# Patient Record
Sex: FEMALE | Race: WHITE | URBAN - NONMETROPOLITAN AREA
[De-identification: names, ages, dates, MRNs, and addresses within clinical notes are randomized per-mention and may not be internally consistent; named-entity substitution may affect disease eponyms.]

---

## 2022-09-15 ENCOUNTER — HOSPITAL ENCOUNTER (OUTPATIENT)
Dept: OCCUPATIONAL THERAPY | Age: 57
Setting detail: THERAPIES SERIES
Discharge: HOME | End: 2022-09-15
Payer: COMMERCIAL

## 2022-09-15 PROCEDURE — 97165 OT EVAL LOW COMPLEX 30 MIN: CPT

## 2022-09-15 PROCEDURE — 97110 THERAPEUTIC EXERCISES: CPT

## 2022-09-15 NOTE — PROGRESS NOTES
** PLEASE SIGN, DATE AND TIME CERTIFICATION BELOW AND RETURN TO [facility] OUTPATIENT REHABILITATION (FAX #: [phone]). ATTEST/CO-SIGN IF ACCESSING VIA INElectronic Brailler. THANK YOU.**    I certify that I have examined the patient below and determined that Physical Medicine and Rehabilitation service is necessary and that I approve the established plan of care for up to 90 days or as specifically noted.   Attestation, signature or co-signature of physician indicates approval of certification requirements.    ________________________ ____________ __________  Physician Signature   Date   Time   [address]  [x] EVALUATION  [] DAILY NOTE (LAND) [] DAILY NOTE (AQUATIC ) [] PROGRESS NOTE [] DISCHARGE NOTE    [] [facility]   [] [facility] 90    [] [facility]   [x] Precious Citizen    Date: 9/15/2022  Patient Name:  Alba Orantes  : 1965  MRN: [id number]  CSN: [id number]    Referring Practitioner Sen Melgar DO   Diagnosis Adhesive capsulitis of left shoulder [M75.02]  Incomplete rotator cuff tear or rupture of left shoulder, not specified as traumatic [M75.112]    Treatment Diagnosis Adhesive capsulitis of left shoulder   Date of Evaluation 9/15/22      Functional Outcome Measure Used Upper Extremity Functional Scale   Functional Outcome Score 72/80 (9/15/22)       Insurance: Primary: Payor: Lars Mitchell /  /  / ,   Secondary:    Authorization Information: 90 visits PT/OT/ST combined - none used, no precert required, aquatics covered, modalities covered   Visit # 1, 1/10 for progress note   Visits Allowed: 30   Recertification Date: November 10, 2022   Physician Follow-Up: 2022   Physician Orders: Script for eval adhesive capsulitis of left shoulder, incomplete tear of left rotator cuff, unspecified whether traumatic   Pertinent History: Patient reports about 6 months ago she notices she was having some mobility and strength issues in her left shoulder and when she moved it would catch. No specific injury reported. Patient reports partial tear and a cyst on left shoulder per MRI. Past medical history includes high blood pressure. SUBJECTIVE: Patient states she would like education on exercises and she feels she can do her exercises on her own. Social/Functional History:  Medications and Allergies have been reviewed and are listed on the Medical History Questionnaire    Rodolfo Gonsalves lives with spouse in a multiple floor home with stairs and a handrail to enter. .    Task Prior Level of Function  (current level of function addressed below)   ADLs  Independent   Ambulation Independent   Transfers Independent   Hobbies Gardening, refinish furniture, crafts, DIY   Driving Active    Work Retired from insurance      OBJECTIVE:  Hand Dominance right handed   Palpation Crepitus reported by patient, palpated in upper trap   Observation    Posture Left shoulder lower than right   Edema None observed       ADL's Difficulty with dressing, lifting, most difficult with sleeping, difficulty with dressing, decreased strength with gardening, difficulty with lifting over head, decreased ROM, difficulty with overhead reaching and doing her hair, pain with drying back       Sensation Left Upper Extremity: Diminished.   Numbness and tingling reported from shoulder to elbow at times   Coordination Impaired: digit tip opposition intact           LEFT UPPER EXTREMITY  RANGE OF MOTION    AROM PROM COMMENTS         Shoulder Flexion 132 138    Shoulder Extension 43 48    Shoulder Abduction 128 140    Shoulder Adduction      Shoulder External Rotation 55 60    Shoulder Internal Rotation 40 48    Shoulder Range of Motion is [facility] [place]  []      Elbow Flexion      Elbow Extension      Forearm Pronation      Forearm Supination      Elbow Range of Motion is [place] [place]  [x]      Wrist Flexion      Wrist Extension      Wrist Radial Deviation      Wrist Ulnar Deviation      Wrist Range of Motion is [facility] [place]  [x]   If no measurement is recorded, no formal assessment was completed for that motion. LEFT UPPER EXTREMITY  STRENGTH    Strength Rating Comments   Shoulder Flexion 4/5    Shoulder Extension 4/5    Shoulder Abduction 4-/5    Shoulder Adduction 4-/5    Shoulder External Rotation 4-/5    Shoulder Internal Rotation 4-/5    []  Shoulder Strength is grossly WFL. Elbow Flexion 4/5    Elbow Extension 4/5    Forearm Pronation     Forearm Supination     [] Elbow Strength is grossly WFL. Wrist Flexion     Wrist Extension     Wrist Radial Deviation     Wrist Ulnar Deviation     [x]  Wrist Strength is grossly WFL. Right Left    Strength Settinnd 70 45   Pinch Strength Tip Pinch:      Lateral Pinch           If no ratings are recorded, no formal assessment was completed.      TREATMENT   Precautions:  none per patient    Pain: 3/10 left anterior shoulder      X in shaded column indicates Activity Completed Today   Modalities Parameters/  Location  Notes/Comments                     Manual Therapy Time/  Technique  Notes/Comments                     Exercises   Sets/  Sec Reps  Notes/Comments   Scapular retraction 1 10 X    Wall slides for flexion and scaption 5 5 X    Modified corner pec stretch 10 3 X    Posterior capsule stretch 10 3 X                         Activities Time    Notes/Comments                       Specific Interventions Next Treatment: ultrasound as needed for pain control, Kinesiotaping, AROM, AAROM, PROM, rotator cuff strengthening, stretching    Activity/Treatment Tolerance:  [x]  Patient tolerated treatment well  []  Patient limited by fatigue  []  Patient limited by pain   []  Patient limited by other medical complications  []  Other:     Assessment: Patient with impaired left shoulder active and passive ROM, decreased shoulder strength and pain anterior shoulder with report of partial rotator cuff tear, shoulder cyst and bursitis. Patient has difficulty with dressing and bathing, doing her hair, reaching over head, sleeping, lifting, pain with working out. Patient requires skilled OT to increase functional ROM and strength, decrease pain and return to prior level of functioning. Areas for Improvement: impaired ROM, impaired sensation, impaired strength, and pain  Prognosis: good    GOALS:  Patient Goal: increase strength, get rid of pain with sleeping    Short Term Goals:  Time Frame: 5 weeks   Patient will be independent with UE HEP for increased ease with dressing and doing her hair. Patient will increase AROM left shoulder flexion to 152, extension to 50, abduction to 138, IR to 50 and ER to 65 degrees for increased ease with overhead reaching. Patient will increase PROM left shoulder flexion to 148, extension to 55, abduction to 150, IR to 58 and ER to 70 degrees for increased ease with AROM and hooking her bra. Patient will report pain in her left shoulder no greater than 2/10 with reaching tasks. Long Term Goals:  Time Frame: 8 weeks  1. Patient will improve UEFS to at least 76.  2.  Patient will demonstrate ability to reach overhead for an object with affected extremity without increased pain. 3.  Patient will report increased ease with sleep with no instances of left shoulder pain waking her at night. Patient Education:   [x]  HEP/Education Completed: Plan of Care, Goals,   [address] Access Code for HEP: Access Code: [email or web address]  URL: [email or web address]  Date: 09/15/2022  Prepared by:  Alex Kamara    Exercises  Seated Scapular Retraction - 2-3 x daily - 7 x weekly - 1 sets - 10 reps  Standing Shoulder Posterior Capsule Stretch - 2-3 x daily - 7 x weekly - 1 sets - 10 reps  Wall [place] - 2 x daily - 7 x weekly - 1 sets - 10 reps - 3 hold  Shoulder Flexion Wall Slide with Towel - 2 x daily - 7 x weekly - 1 sets - 10 reps - 3 hold  Standing Shoulder Abduction Slides at Wall - 2 x daily - 7 x weekly - 1 sets - 10 reps - 3 hold  Doorway Pec Stretch at 60 Elevation - 2-3 x daily - 7 x weekly - 1 sets - 5 reps - 10 hold  Sleeper Stretch - 2 x daily - 7 x weekly - 1 sets - 5 reps - 10-15 hold    []  No new Education completed  []  Reviewed Prior HEP      [x]  Patient verbalized and/or demonstrated understanding of education provided. []  Patient unable to verbalize and/or demonstrate understanding of education provided. Will continue education. [x]  Barriers to learning: none    PLAN:  Treatment Recommendations: Strengthening, Range of Motion, Manual Therapy - Soft Tissue Mobilization, Manual Therapy - Joint Manipulation, Home Exercise Program, and Modalities    [x]  Plan of care initiated. Plan to see patient 2 times per week for 8 weeks to address the treatment planned outlined above. []  Continue with current plan of care  []  Modify plan of care as follows:    []  Hold pending physician visit  []  Discharge    Time In 0818   Time Out 0910   Timed Code Minutes: 10 min   Total Treatment Time: 52 min       Electronically Signed by:  AMANDA Ivan/SHERITA #[id number]

## 2022-09-22 ENCOUNTER — HOSPITAL ENCOUNTER (OUTPATIENT)
Dept: OCCUPATIONAL THERAPY | Age: 57
Setting detail: THERAPIES SERIES
Discharge: HOME | End: 2022-09-22
Payer: COMMERCIAL

## 2022-09-22 PROCEDURE — 97110 THERAPEUTIC EXERCISES: CPT

## 2022-09-22 PROCEDURE — 97035 APP MDLTY 1+ULTRASOUND EA 15: CPT

## 2022-09-22 NOTE — PROGRESS NOTES
[address] THERAPY  [] EVALUATION  [x] DAILY NOTE (LAND) [] DAILY NOTE (AQUATIC ) [] PROGRESS NOTE [] DISCHARGE NOTE    [] [address] [facility] [place]   [] [facility] [address]    [] [address] [employer]   [x] Celi Harris    Date: 2022  Patient Name:  Jennifer Bansal  : 1965  MRN: [id number]  CSN: [id number]    Referring Practitioner Carmen Alvarado DO   Diagnosis Adhesive capsulitis of left shoulder [M75.02]  Incomplete rotator cuff tear or rupture of left shoulder, not specified as traumatic [M75.112]    Treatment Diagnosis Adhesive capsulitis of left shoulder   Date of Evaluation 9/15/22      Functional Outcome Measure Used Upper Extremity Functional Scale   Functional Outcome Score 72/80 (9/15/22)       Insurance: Primary: Payor: Samanta Ferguson /  /  / ,   Secondary:    Authorization Information: 90 visits PT/OT/ST combined - none used, no precert required, aquatics covered, modalities covered   Visit # 2, 2/10 for progress note   Visits Allowed:    Recertification Date: November 10, 2022   Physician Follow-Up: 2022   Physician Orders: Script for eval adhesive capsulitis of left shoulder, incomplete tear of left rotator cuff, unspecified whether traumatic   Pertinent History: Patient reports about 6 months ago she notices she was having some mobility and strength issues in her left shoulder and when she moved it would catch. No specific injury reported. Patient reports partial tear and a cyst on left shoulder per MRI. Past medical history includes high blood pressure. SUBJECTIVE: Patient states she she messed up her back and has a chiropractor appointment today. Patient reports she tries to put her hand behind her back and that is when she notices clicking in her shoulder.         OBJECTIVE:    TREATMENT   Precautions:  none per patient    Pain: 3/10 left anterior shoulder      X in shaded column indicates Activity Completed Today   Modalities Parameters/  Location  Notes/Comments   Ultrasound to anterior shoulder for pain control 100% continuous, 1.1 henley/cm2, 1 MHz x 8 minutes X    STM to left anterior and lateral shoulder for tight/painful musculature  X          Manual Therapy Time/  Technique  Notes/Comments   Supine PROM to left shoulder all planes to tolerance  X Tightest in ER with most tenderness reported               Exercises   Sets/  Sec Reps  Notes/Comments   Scapular retraction 1 10 X    Pulleys for shoulder flexion 1 10 X    Supine ER with hands behind head for ER stretch 5 sec 5 X    Supine dowel yelitza for shoulder flexion with joint distraction for improved tolerance, horizontal abduction/adduction 1  10 ea X    IR sleeper stretch 15 sec 5 X Cues and demonstration for correct technique   Wall slides for flexion and scaption 5 5     Modified corner pec stretch 10 3 X    Posterior capsule stretch 10 3 X Completed in supine today                        Activities Time    Notes/Comments                       Specific Interventions Next Treatment: ultrasound as needed for pain control, Kinesiotaping, AROM, AAROM, PROM, rotator cuff strengthening, stretching    Activity/Treatment Tolerance:  [x]  Patient tolerated treatment well  []  Patient limited by fatigue  []  Patient limited by pain   []  Patient limited by other medical complications  []  Other:     Assessment: Patient is progressing towards her goals. Patient with tenderness with ER stretch. Patient indicates she currently has back pain issues. Areas for Improvement: impaired ROM, impaired sensation, impaired strength, and pain  Prognosis: good    GOALS:  Patient Goal: increase strength, get rid of pain with sleeping    Short Term Goals:  Time Frame: 5 weeks   Patient will be independent with UE HEP for increased ease with dressing and doing her hair.   Patient will increase AROM left shoulder flexion to 152, extension to 50, abduction to 138, IR to 50 and ER to 65 degrees for increased ease with overhead reaching. Patient will increase PROM left shoulder flexion to 148, extension to 55, abduction to 150, IR to 58 and ER to 70 degrees for increased ease with AROM and hooking her bra. Patient will report pain in her left shoulder no greater than 2/10 with reaching tasks. Long Term Goals:  Time Frame: 8 weeks  1. Patient will improve UEFS to at least 76.  2.  Patient will demonstrate ability to reach overhead for an object with affected extremity without increased pain. 3.  Patient will report increased ease with sleep with no instances of left shoulder pain waking her at night. Patient Education:   [x]  HEP/Education Completed: Plan of Care, Goals,   [address] Access Code for HEP: Access Code: [email or web address]  URL: [email or web address]  Date: 09/15/2022  Prepared by: Opal Hart    Exercises  Seated Scapular Retraction - 2-3 x daily - 7 x weekly - 1 sets - 10 reps  Standing Shoulder Posterior Capsule Stretch - 2-3 x daily - 7 x weekly - 1 sets - 10 reps  Wall [place] - 2 x daily - 7 x weekly - 1 sets - 10 reps - 3 hold  Shoulder Flexion Wall Slide with Towel - 2 x daily - 7 x weekly - 1 sets - 10 reps - 3 hold  Standing Shoulder Abduction Slides at Wall - 2 x daily - 7 x weekly - 1 sets - 10 reps - 3 hold  Doorway Pec Stretch at 60 Elevation - 2-3 x daily - 7 x weekly - 1 sets - 5 reps - 10 hold  Sleeper Stretch - 2 x daily - 7 x weekly - 1 sets - 5 reps - 10-15 hold  9/22/22: Purpose of ultrasound  []  No new Education completed  [x]  Reviewed Prior HEP      [x]  Patient verbalized and/or demonstrated understanding of education provided. []  Patient unable to verbalize and/or demonstrate understanding of education provided. Will continue education.   [x]  Barriers to learning: none    PLAN:  Treatment Recommendations: Strengthening, Range of Motion, Manual Therapy - Soft Tissue Mobilization, Manual Therapy - Joint Manipulation, Home Exercise Program, and Modalities    []  Plan of care initiated. Plan to see patient 2 times per week for 8 weeks to address the treatment planned outlined above. [x]  Continue with current plan of care  []  Modify plan of care as follows:    []  Hold pending physician visit  []  Discharge    Time In 0843   Time Out 0926   Timed Code Minutes: 43 min   Total Treatment Time: 43 min       Electronically Signed by:  Velasquez Hamilton OTR/L #[id number]

## 2022-09-23 ENCOUNTER — HOSPITAL ENCOUNTER (OUTPATIENT)
Dept: OCCUPATIONAL THERAPY | Age: 57
Setting detail: THERAPIES SERIES
Discharge: HOME | End: 2022-09-23
Payer: COMMERCIAL

## 2022-09-23 PROCEDURE — 97035 APP MDLTY 1+ULTRASOUND EA 15: CPT

## 2022-09-23 PROCEDURE — 97110 THERAPEUTIC EXERCISES: CPT

## 2022-09-23 PROCEDURE — 97140 MANUAL THERAPY 1/> REGIONS: CPT

## 2022-09-23 NOTE — PROGRESS NOTES
[address]ER[address]  [] EVALUATION  [x] DAILY NOTE (LAND) [] DAILY NOTE (AQUATIC ) [] PROGRESS NOTE [] DISCHARGE NOTE    [] [address] [facility]   [] [facility] [address]    [] [address] [employer]   [x] Sueope Draft    Date: 2022  Patient Name:  Shan Ranks  : 1965  MRN: [id number]  CSN: [id number]    Referring Practitioner Ismael Salazar DO   Diagnosis Adhesive capsulitis of left shoulder [M75.02]  Incomplete rotator cuff tear or rupture of left shoulder, not specified as traumatic [M75.112]    Treatment Diagnosis Adhesive capsulitis of left shoulder   Date of Evaluation 9/15/22      Functional Outcome Measure Used Upper Extremity Functional Scale   Functional Outcome Score 72/80 (9/15/22)       Insurance: Primary: Payor: Mary Ellen Callahan /  /  / ,   Secondary:    Authorization Information: 90 visits PT/OT/ST combined - none used, no precert required, aquatics covered, modalities covered   Visit # 3, 3/10 for progress note   Visits Allowed:    Recertification Date: November 10, 2022   Physician Follow-Up: 2022   Physician Orders: Script for eval adhesive capsulitis of left shoulder, incomplete tear of left rotator cuff, unspecified whether traumatic   Pertinent History: Patient reports about 6 months ago she notices she was having some mobility and strength issues in her left shoulder and when she moved it would catch. No specific injury reported. Patient reports partial tear and a cyst on left shoulder per MRI. Past medical history includes high blood pressure. SUBJECTIVE: Patient reports \"very crunchy\". Patient reports her pain stays about the same throughout the day, has high pain tolerance.        OBJECTIVE:    TREATMENT   Precautions:  none per patient    Pain: 3/10 left anterior shoulder      X in shaded column indicates Activity Completed Today   Modalities Parameters/  Location  Notes/Comments Ultrasound to anterior shoulder for pain control 100% continuous, 1.1 henley/cm2, 1 MHz x 8 minutes X    STM to left anterior and lateral shoulder for tight/painful musculature            Manual Therapy Time/  Technique  Notes/Comments   Supine PROM to left shoulder all planes to tolerance  X Tight end ranges all motions, most tight in ER. Fair improvement after progressive stretching    GH mobs   X          Exercises   Sets/  Sec Reps  Notes/Comments   Scapular retraction 3 sec 10 X    Pulleys for shoulder flexion 1 10     Supine vertical towel roll with manual overpressure from therapist    X    Supine ER with hands behind head for ER stretch 10  sec 5 X Vertical towel roll    Supine dowel yelitza shoulder flexion 90 to tolerance with joint distraction for improved tolerance, horizontal abduction/adduction 1  10 ea X    Supine dowel ER  5 sec 5 X Initiated. Min cues with good technique. Painful end range but good stretch noted    IR sleeper stretch 15 sec 5  Cues and demonstration for correct technique   Wall slides for flexion and scaption 5 5     Modified corner pec stretch 10 3     Posterior capsule stretch 10 3 X Completed in supine today from manual therapist    Towel stretch IR  10 sec 3 X Initiated. Good stretch noted, weakness                  Activities Time    Notes/Comments   Kinesiotape- Y strip upper trap inhibition, two I strip mechanical correction anterior to posterior shoulder   X Trial this date. Specific Interventions Next Treatment: ultrasound as needed for pain control, Kinesiotaping, AROM, AAROM, PROM, rotator cuff strengthening, stretching    Activity/Treatment Tolerance:  [x]  Patient tolerated treatment well  []  Patient limited by fatigue  []  Patient limited by pain   []  Patient limited by other medical complications  []  Other:     Assessment: Patient is progressing towards her goals. Continued with UE stretches to target ER and IR noted above, with good tolerance. Updated HEP. Trial of kinesiotape with rounded shoulders. Areas for Improvement: impaired ROM, impaired sensation, impaired strength, and pain  Prognosis: good    GOALS:  Patient Goal: increase strength, get rid of pain with sleeping    Short Term Goals:  Time Frame: 5 weeks   Patient will be independent with UE HEP for increased ease with dressing and doing her hair. Patient will increase AROM left shoulder flexion to 152, extension to 50, abduction to 138, IR to 50 and ER to 65 degrees for increased ease with overhead reaching. Patient will increase PROM left shoulder flexion to 148, extension to 55, abduction to 150, IR to 58 and ER to 70 degrees for increased ease with AROM and hooking her bra. Patient will report pain in her left shoulder no greater than 2/10 with reaching tasks. Long Term Goals:  Time Frame: 8 weeks  1. Patient will improve UEFS to at least 76.  2.  Patient will demonstrate ability to reach overhead for an object with affected extremity without increased pain. 3.  Patient will report increased ease with sleep with no instances of left shoulder pain waking her at night. Patient Education:   [x]  HEP/Education Completed: Plan of Care, Goals,   [address] Access Code for HEP: Access Code: [email or web address]  URL: [email or web address]  Date: 09/15/2022  Prepared by:  Tera Carr    Exercises  Seated Scapular Retraction - 2-3 x daily - 7 x weekly - 1 sets - 10 reps  Standing Shoulder Posterior Capsule Stretch - 2-3 x daily - 7 x weekly - 1 sets - 10 reps  Wall [place] - 2 x daily - 7 x weekly - 1 sets - 10 reps - 3 hold  Shoulder Flexion Wall Slide with Towel - 2 x daily - 7 x weekly - 1 sets - 10 reps - 3 hold  Standing Shoulder Abduction Slides at Wall - 2 x daily - 7 x weekly - 1 sets - 10 reps - 3 hold  Doorway Pec Stretch at 60 Elevation - 2-3 x daily - 7 x weekly - 1 sets - 5 reps - 10 hold  Sleeper Stretch - 2 x daily - 7 x weekly - 1 sets - 5 reps - 10-15 hold  9/22/22: Purpose of ultrasound  9/23/22: IR towel stretch, supine ER/IR stretch, supine pec stretch on vertical towel roll  []  No new Education completed  [x]  Reviewed Prior HEP      [x]  Patient verbalized and/or demonstrated understanding of education provided. []  Patient unable to verbalize and/or demonstrate understanding of education provided. Will continue education. [x]  Barriers to learning: none    PLAN:  Treatment Recommendations: Strengthening, Range of Motion, Manual Therapy - Soft Tissue Mobilization, Manual Therapy - Joint Manipulation, Home Exercise Program, and Modalities    []  Plan of care initiated. Plan to see patient 2 times per week for 8 weeks to address the treatment planned outlined above. [x]  Continue with current plan of care  []  Modify plan of care as follows:    []  Hold pending physician visit  []  Discharge    Time In 0730   Time Out 0820   Timed Code Minutes: 50 min   Total Treatment Time: 50 min       Electronically Signed by:  Ayaka LAST #[id number]

## 2022-09-26 ENCOUNTER — HOSPITAL ENCOUNTER (OUTPATIENT)
Dept: OCCUPATIONAL THERAPY | Age: 57
Setting detail: THERAPIES SERIES
Discharge: HOME | End: 2022-09-26
Payer: COMMERCIAL

## 2022-09-26 PROCEDURE — 97035 APP MDLTY 1+ULTRASOUND EA 15: CPT

## 2022-09-26 PROCEDURE — 97110 THERAPEUTIC EXERCISES: CPT

## 2022-09-26 NOTE — PROGRESS NOTES
[address]  [] EVALUATION  [x] DAILY NOTE (LAND) [] DAILY NOTE (AQUATIC ) [] PROGRESS NOTE [] DISCHARGE NOTE    [] [address] - [place]   [] [address]    [] [address] [facility]   [x] Michelig[facility] Bending    Date: 2022  Patient Name:  Joanne Diaz  : 1965  MRN: [id number]  CSN: [id number]    Referring Practitioner Anna Samayoa DO   Diagnosis Adhesive capsulitis of left shoulder [M75.02]  Incomplete rotator cuff tear or rupture of left shoulder, not specified as traumatic [M75.112]    Treatment Diagnosis Adhesive capsulitis of left shoulder   Date of Evaluation 9/15/22      Functional Outcome Measure Used Upper Extremity Functional Scale   Functional Outcome Score 72/80 (9/15/22)       Insurance: Primary: Payor: Bridget Valdez /  /  / ,   Secondary:    Authorization Information: 90 visits PT/OT/ST combined - none used, no precert required, aquatics covered, modalities covered   Visit # 4, 4/10 for progress note   Visits Allowed:    Recertification Date: November 10, 2022   Physician Follow-Up: 2022   Physician Orders: Script for eval adhesive capsulitis of left shoulder, incomplete tear of left rotator cuff, unspecified whether traumatic   Pertinent History: Patient reports about 6 months ago she notices she was having some mobility and strength issues in her left shoulder and when she moved it would catch. No specific injury reported. Patient reports partial tear and a cyst on left shoulder per MRI. Past medical history includes high blood pressure. SUBJECTIVE: Patient reports she had to ice over the weekend and wonders if she over did her exercises. Patient reports soreness is more posterior today.       OBJECTIVE:    TREATMENT   Precautions:  none per patient    Pain: 3/10 left anterior shoulder      X in shaded column indicates Activity Completed Today   Modalities Parameters/  Location Notes/Comments   Ultrasound to  should posterior for pain control 100% continuous, 1.1 henley/cm2, 1 MHz x 8 minutes X    STM to left anterior and lateral shoulder for tight/painful musculature            Manual Therapy Time/  Technique  Notes/Comments   Supine PROM to left shoulder all planes to tolerance  X Tight end ranges all motions, most tight in ER. Improvement after progressive stretching    GH mobs   X          Exercises   Sets/  Sec Reps  Notes/Comments   Scapular retraction 3 sec 10 X    Pulleys for shoulder flexion 1 10     Supine vertical towel roll with manual overpressure from therapist    X    Supine ER with hands behind head for ER stretch 10  sec 5 X Vertical towel roll, some discomfort with bringing arms forward in posterior shoulder    Supine dowel yelitza shoulder flexion 90 to tolerance with joint distraction for improved tolerance, horizontal abduction/adduction 1  10 ea X    Supine dowel ER  5 sec 5  Initiated. Min cues with good technique. Painful end range but good stretch noted    IR sleeper stretch 15 sec 5  Cues and demonstration for correct technique   Wall slides for flexion and scaption 5 5     Modified corner pec stretch 10 3     Posterior capsule stretch 10 3 X Completed in supine   Towel stretch IR  10 sec 3  Initiated. Good stretch noted, weakness                  Activities Time    Notes/Comments   Kinesiotape- Y strip upper trap inhibition, two I strip mechanical correction anterior to posterior shoulder   X Patient states she thinks Kinesiotape helped. Specific Interventions Next Treatment: ultrasound as needed for pain control, Kinesiotaping, AROM, AAROM, PROM, rotator cuff strengthening, stretching    Activity/Treatment Tolerance:  [x]  Patient tolerated treatment well  []  Patient limited by fatigue  []  Patient limited by pain   []  Patient limited by other medical complications  []  Other:     Assessment: Patient is progressing towards her goals.   Patient reports discomfort more posterior than anterior today. Areas for Improvement: impaired ROM, impaired sensation, impaired strength, and pain  Prognosis: good    GOALS:  Patient Goal: increase strength, get rid of pain with sleeping    Short Term Goals:  Time Frame: 5 weeks   Patient will be independent with UE HEP for increased ease with dressing and doing her hair. Patient will increase AROM left shoulder flexion to 152, extension to 50, abduction to 138, IR to 50 and ER to 65 degrees for increased ease with overhead reaching. Patient will increase PROM left shoulder flexion to 148, extension to 55, abduction to 150, IR to 58 and ER to 70 degrees for increased ease with AROM and hooking her bra. Patient will report pain in her left shoulder no greater than 2/10 with reaching tasks. Long Term Goals:  Time Frame: 8 weeks  1. Patient will improve UEFS to at least 76.  2.  Patient will demonstrate ability to reach overhead for an object with affected extremity without increased pain. 3.  Patient will report increased ease with sleep with no instances of left shoulder pain waking her at night. Patient Education:   [x]  HEP/Education Completed: Plan of Care, Goals,   [address] Access Code for HEP: Access Code: [email or web address]  URL: [email or web address]  Date: 09/15/2022  Prepared by:  Renan Read    Exercises  Seated Scapular Retraction - 2-3 x daily - 7 x weekly - 1 sets - 10 reps  Standing Shoulder Posterior Capsule Stretch - 2-3 x daily - 7 x weekly - 1 sets - 10 reps  Wall [place] - 2 x daily - 7 x weekly - 1 sets - 10 reps - 3 hold  Shoulder Flexion Wall Slide with Towel - 2 x daily - 7 x weekly - 1 sets - 10 reps - 3 hold  Standing Shoulder Abduction Slides at Wall - 2 x daily - 7 x weekly - 1 sets - 10 reps - 3 hold  Doorway Pec Stretch at 60 Elevation - 2-3 x daily - 7 x weekly - 1 sets - 5 reps - 10 hold  Sleeper Stretch - 2 x daily - 7 x weekly - 1 sets - 5 reps - 10-15 hold  9/22/22:

## 2022-09-29 ENCOUNTER — HOSPITAL ENCOUNTER (OUTPATIENT)
Dept: OCCUPATIONAL THERAPY | Age: 57
Setting detail: THERAPIES SERIES
Discharge: HOME | End: 2022-09-29
Payer: COMMERCIAL

## 2022-09-29 PROCEDURE — 97110 THERAPEUTIC EXERCISES: CPT

## 2022-09-29 PROCEDURE — 97035 APP MDLTY 1+ULTRASOUND EA 15: CPT

## 2022-09-29 PROCEDURE — 97140 MANUAL THERAPY 1/> REGIONS: CPT

## 2022-09-29 NOTE — PROGRESS NOTES
[address] THERAPY  [] EVALUATION  [x] DAILY NOTE (LAND) [] DAILY NOTE (AQUATIC ) [] PROGRESS NOTE [] DISCHARGE NOTE    [] [address] - [place]   [] [address]    [] [address] [facility]   [x] Tali Aus    Date: 2022  Patient Name:  Jr Cooley  : 1965  MRN: [id number]  CSN: [id number]    Referring Practitioner Sherie Talamantes DO   Diagnosis Adhesive capsulitis of left shoulder [M75.02]  Incomplete rotator cuff tear or rupture of left shoulder, not specified as traumatic [M75.112]    Treatment Diagnosis Adhesive capsulitis of left shoulder   Date of Evaluation 9/15/22      Functional Outcome Measure Used Upper Extremity Functional Scale   Functional Outcome Score 72/80 (9/15/22)       Insurance: Primary: [facility] /  /  / ,   Secondary:    Authorization Information: 90 visits PT/OT/ST combined - none used, no precert required, aquatics covered, modalities covered   Visit # 5, 5/10 for progress note   Visits Allowed:    Recertification Date: November 10, 2022   Physician Follow-Up: 2022   Physician Orders: Script for eval adhesive capsulitis of left shoulder, incomplete tear of left rotator cuff, unspecified whether traumatic   Pertinent History: Patient reports about 6 months ago she notices she was having some mobility and strength issues in her left shoulder and when she moved it would catch. No specific injury reported. Patient reports partial tear and a cyst on left shoulder per MRI. Past medical history includes high blood pressure. SUBJECTIVE: Patient reports her shoulder is a little sore. She states the pain varies depending on her activity. She states difficulty with reaching behind her back.        OBJECTIVE:    TREATMENT   Precautions:  none per patient    Pain: 3-4/10 left posterior shoulder      X in shaded column indicates Activity Completed Today   Modalities Parameters/  Location  Notes/Comments   Ultrasound to L shoulder posterior for pain control 100% continuous, 1.1 henley/cm2, 1 MHz x 8 minutes X    STM manually and with IASTM tools to left posterior shoulder and upper trap for tight/painful musculature, STM anterior and lateral shoulder  X          Manual Therapy Time/  Technique  Notes/Comments   Supine PROM to left shoulder all planes to tolerance, PROM for IR in sidelying  X Tight end ranges all motions, most tight/painful in ER. Improvement after progressive stretching    GH mobs   X    Sidelying scap mobs  X    Exercises   Sets/  Sec Reps  Notes/Comments   Scapular retraction 3 sec 10     Pulleys for shoulder flexion 1 10     Supine vertical towel roll with manual overpressure from therapist    X    Supine ER with hands behind head for ER stretch 10  sec 5 X Vertical towel roll, some discomfort with bringing arms forward in posterior shoulder, improvement after STM with ER motion   Supine dowel yelitza shoulder flexion 90 to tolerance with joint distraction for improved tolerance, horizontal abduction/adduction 1  10 ea X    Supine dowel ER  5 sec 5  Initiated. Min cues with good technique. Painful end range but good stretch noted    IR sleeper stretch 15 sec 5  Cues and demonstration for correct technique   Wall slides for flexion and scaption 5 5     Modified corner pec stretch 10 3     Posterior capsule stretch 10 3  Completed in supine   Towel stretch IR  10 sec 3  Initiated. Good stretch noted, weakness                  Activities Time    Notes/Comments   Kinesiotape- Y strip upper trap inhibition, one I strip mechanical correction anterior to posterior shoulder and one I strip for bicep inhibition   X Patient states she thinks Kinesiotape helped.                  Specific Interventions Next Treatment: ultrasound as needed for pain control, Kinesiotaping, AROM, AAROM, PROM, rotator cuff strengthening, stretching    Activity/Treatment Tolerance:  [x]  Patient tolerated treatment well  []  Patient limited by fatigue  []  Patient limited by pain   []  Patient limited by other medical complications  []  Other:     Assessment: Patient is progressing towards her goals. Areas for Improvement: impaired ROM, impaired sensation, impaired strength, and pain  Prognosis: good    GOALS:  Patient Goal: increase strength, get rid of pain with sleeping    Short Term Goals:  Time Frame: 5 weeks   Patient will be independent with UE HEP for increased ease with dressing and doing her hair. Patient will increase AROM left shoulder flexion to 152, extension to 50, abduction to 138, IR to 50 and ER to 65 degrees for increased ease with overhead reaching. Patient will increase PROM left shoulder flexion to 148, extension to 55, abduction to 150, IR to 58 and ER to 70 degrees for increased ease with AROM and hooking her bra. Patient will report pain in her left shoulder no greater than 2/10 with reaching tasks. Long Term Goals:  Time Frame: 8 weeks  1. Patient will improve UEFS to at least 76.  2.  Patient will demonstrate ability to reach overhead for an object with affected extremity without increased pain. 3.  Patient will report increased ease with sleep with no instances of left shoulder pain waking her at night. Patient Education:   [x]  HEP/Education Completed: Plan of Care, Goals,   [address] Access Code for HEP: Access Code: [email or web address]  URL: [email or web address]  Date: 09/15/2022  Prepared by:  Ibrahima Olivia    Exercises  Seated Scapular Retraction - 2-3 x daily - 7 x weekly - 1 sets - 10 reps  Standing Shoulder Posterior Capsule Stretch - 2-3 x daily - 7 x weekly - 1 sets - 10 reps  Wall [place] - 2 x daily - 7 x weekly - 1 sets - 10 reps - 3 hold  Shoulder Flexion Wall Slide with Towel - 2 x daily - 7 x weekly - 1 sets - 10 reps - 3 hold  Standing Shoulder Abduction Slides at Wall - 2 x daily - 7 x weekly - 1 sets - 10 reps - 3 hold  Doorway Pec Stretch

## 2022-10-03 ENCOUNTER — HOSPITAL ENCOUNTER (OUTPATIENT)
Dept: OCCUPATIONAL THERAPY | Age: 57
Setting detail: THERAPIES SERIES
Discharge: HOME | End: 2022-10-03
Payer: COMMERCIAL

## 2022-10-03 PROCEDURE — 97035 APP MDLTY 1+ULTRASOUND EA 15: CPT

## 2022-10-03 PROCEDURE — 97110 THERAPEUTIC EXERCISES: CPT

## 2022-10-03 NOTE — PROGRESS NOTES
[address] THERAPY  [] EVALUATION  [x] DAILY NOTE (LAND) [] DAILY NOTE (AQUATIC ) [] PROGRESS NOTE [] DISCHARGE NOTE    [] [address] - [place]   [] [address]    [] [address] [facility]   [x] Stan Show    Date: 10/3/2022  Patient Name:  Mara Scott  : 1965  MRN: [id number]  CSN: [id number]    Referring Practitioner Edward Winn DO   Diagnosis Adhesive capsulitis of left shoulder [M75.02]  Incomplete rotator cuff tear or rupture of left shoulder, not specified as traumatic [M75.112]    Treatment Diagnosis Adhesive capsulitis of left shoulder   Date of Evaluation 9/15/22      Functional Outcome Measure Used Upper Extremity Functional Scale   Functional Outcome Score 72/80 (9/15/22)       Insurance: Primary: Payor: Jitendra Hart /  /  / ,   Secondary:    Authorization Information: 90 visits PT/OT/ST combined - none used, no precert required, aquatics covered, modalities covered   Visit # 6, 6/10 for progress note   Visits Allowed:    Recertification Date: November 10, 2022   Physician Follow-Up: 2022   Physician Orders: Script for eval adhesive capsulitis of left shoulder, incomplete tear of left rotator cuff, unspecified whether traumatic   Pertinent History: Patient reports about 6 months ago she notices she was having some mobility and strength issues in her left shoulder and when she moved it would catch. No specific injury reported. Patient reports partial tear and a cyst on left shoulder per MRI. Past medical history includes high blood pressure. SUBJECTIVE: Patient reports she had some \"good popping\"  today that felt good.          OBJECTIVE:    TREATMENT   Precautions:  none per patient    Pain: 2/10 left posterior shoulder      X in shaded column indicates Activity Completed Today   Modalities Parameters/  Location  Notes/Comments   Ultrasound to L shoulder posterior for pain control 100% continuous, 1.1 henley/cm2, 1 MHz x 8 minutes X                Manual Therapy Time/  Technique  Notes/Comments   Supine PROM to left shoulder all planes to tolerance, PROM for IR in sidelying, bolster under legs  X Improved ROM and tolerance. Tightest in ER   STM manually and with IASTM tools to left posterior shoulder and upper trap for tight/painful musculature  X    GH mobs   X    Sidelying scap mobs      Exercises   Sets/  Sec Reps  Notes/Comments   Scapular retraction 3 sec 10 X    Pulleys for shoulder flexion 1 10 X    Supine vertical towel roll with manual overpressure from therapist    X    Supine ER with hands behind head for ER stretch 10  sec 5 X Vertical towel roll under spine. Patient reports strength to bring elbows to ceiling is limited   Supine dowel yelitza shoulder flexion 90 to tolerance with joint distraction for improved tolerance, horizontal abduction/adduction 1  10 ea X    Supine SA punch  2 sec 10 X    Supine shoulder circles CW and CCW 1 10 ea X    [employer] UBE at 90 RPM 3 minutes all backwards  3 minutes X    Supine dowel ER  5 sec 5  Initiated. Min cues with good technique. Painful end range but good stretch noted    IR sleeper stretch 15 sec 5  Cues and demonstration for correct technique   Wall slides for flexion and scaption 5 5     Modified corner pec stretch 10 3     Posterior capsule stretch 10 3  Completed in supine   Towel stretch IR  10 sec 3  Initiated. Good stretch noted, weakness                  Activities Time    Notes/Comments   Kinesiotape- Y strip upper trap inhibition, one I strip mechanical correction anterior to posterior shoulder and one I strip for bicep inhibition    Patient states she thinks Kinesiotape helped.                  Specific Interventions Next Treatment: ultrasound as needed for pain control, Kinesiotaping, AROM, AAROM, PROM, rotator cuff strengthening, stretching    Activity/Treatment Tolerance:  [x]  Patient tolerated treatment well  []  Patient limited by fatigue  []  Patient limited by pain   []  Patient limited by other medical complications  []  Other:     Assessment: Patient is progressing towards her goals. Areas for Improvement: impaired ROM, impaired sensation, impaired strength, and pain  Prognosis: good    GOALS:  Patient Goal: increase strength, get rid of pain with sleeping    Short Term Goals:  Time Frame: 5 weeks   Patient will be independent with UE HEP for increased ease with dressing and doing her hair. Patient will increase AROM left shoulder flexion to 152, extension to 50, abduction to 138, IR to 50 and ER to 65 degrees for increased ease with overhead reaching. Patient will increase PROM left shoulder flexion to 148, extension to 55, abduction to 150, IR to 58 and ER to 70 degrees for increased ease with AROM and hooking her bra. Patient will report pain in her left shoulder no greater than 2/10 with reaching tasks. Long Term Goals:  Time Frame: 8 weeks  1. Patient will improve UEFS to at least 76.  2.  Patient will demonstrate ability to reach overhead for an object with affected extremity without increased pain. 3.  Patient will report increased ease with sleep with no instances of left shoulder pain waking her at night. Patient Education:   []  HEP/Education Completed: Plan of Care, Goals,   [address] Access Code for HEP: Access Code: [email or web address]  URL: [email or web address]  Date: 09/15/2022  Prepared by:  Mick Jiménez    Exercises  Seated Scapular Retraction - 2-3 x daily - 7 x weekly - 1 sets - 10 reps  Standing Shoulder Posterior Capsule Stretch - 2-3 x daily - 7 x weekly - 1 sets - 10 reps  Wall [place] - 2 x daily - 7 x weekly - 1 sets - 10 reps - 3 hold  Shoulder Flexion Wall Slide with Towel - 2 x daily - 7 x weekly - 1 sets - 10 reps - 3 hold  Standing Shoulder Abduction Slides at Wall - 2 x daily - 7 x weekly - 1 sets - 10 reps - 3 hold  Doorway Pec Stretch at 60 Elevation - 2-3 x daily - 7 x weekly - 1 sets - 5 reps - 10 hold  Sleeper Stretch - 2 x daily - 7 x weekly - 1 sets - 5 reps - 10-15 hold  9/22/22: Purpose of ultrasound  9/23/22: IR towel stretch, supine ER/IR stretch, supine pec stretch on vertical towel roll  9/29/22: discussed balancing muscles with strengthening posterior musculature  []  No new Education completed  [x]  Reviewed Prior HEP, new schedule given. [x]  Patient verbalized and/or demonstrated understanding of education provided. []  Patient unable to verbalize and/or demonstrate understanding of education provided. Will continue education. [x]  Barriers to learning: none    PLAN:  Treatment Recommendations: Strengthening, Range of Motion, Manual Therapy - Soft Tissue Mobilization, Manual Therapy - Joint Manipulation, Home Exercise Program, and Modalities    []  Plan of care initiated. Plan to see patient 2 times per week for 8 weeks to address the treatment planned outlined above. [x]  Continue with current plan of care  []  Modify plan of care as follows:    []  Hold pending physician visit  []  Discharge    Time In 0817   Time Out 0858   Timed Code Minutes: 41 min   Total Treatment Time: 41 min       Electronically Signed by:  Drea Pickett OTR/L #[id number]

## 2022-10-05 ENCOUNTER — APPOINTMENT (OUTPATIENT)
Dept: OCCUPATIONAL THERAPY | Age: 57
End: 2022-10-05
Payer: COMMERCIAL

## 2022-10-06 ENCOUNTER — HOSPITAL ENCOUNTER (OUTPATIENT)
Dept: OCCUPATIONAL THERAPY | Age: 57
Setting detail: THERAPIES SERIES
Discharge: HOME | End: 2022-10-06
Payer: COMMERCIAL

## 2022-10-06 PROCEDURE — 97110 THERAPEUTIC EXERCISES: CPT

## 2022-10-06 PROCEDURE — 97035 APP MDLTY 1+ULTRASOUND EA 15: CPT

## 2022-10-06 NOTE — PROGRESS NOTES
[address]ERAP[address]  [] EVALUATION  [x] DAILY NOTE (LAND) [] DAILY NOTE (AQUATIC ) [] PROGRESS NOTE [] DISCHARGE NOTE    [] [address] - [place]   [] [address]    [] [address] [employer]   [x] Stan Show    Date: 10/6/2022  Patient Name:  Mara Scott  : 1965  MRN: [id number]  CSN: [id number]    Referring Practitioner Edward Winn DO   Diagnosis Adhesive capsulitis of left shoulder [M75.02]  Incomplete rotator cuff tear or rupture of left shoulder, not specified as traumatic [M75.112]    Treatment Diagnosis Adhesive capsulitis of left shoulder   Date of Evaluation 9/15/22      Functional Outcome Measure Used Upper Extremity Functional Scale   Functional Outcome Score 72/80 (9/15/22)       Insurance: Primary: Payor: Jitendra Hart /  /  / ,   Secondary:    Authorization Information: 90 visits PT/OT/ST combined - none used, no precert required, aquatics covered, modalities covered   Visit # 7, 7/10 for progress note   Visits Allowed:    Recertification Date: November 10, 2022   Physician Follow-Up: 2022   Physician Orders: Script for eval adhesive capsulitis of left shoulder, incomplete tear of left rotator cuff, unspecified whether traumatic   Pertinent History:7 Patient reports about 6 months ago she notices she was having some mobility and strength issues in her left shoulder and when she moved it would catch. No specific injury reported. Patient reports partial tear and a cyst on left shoulder per MRI. Past medical history includes high blood pressure. SUBJECTIVE: Patient reports her shoulder feels heavy today. She states it feels heavy today. Patient reports she feels therapy is helping.         OBJECTIVE:  TREATMENT   Precautions:  none per patient    Pain: 3-410 left anterior shoulder      X in shaded column indicates Activity Completed Today   Modalities Parameters/  Location Notes/Comments   Ultrasound to L shoulder anterior shoulder for pain control 100% continuous, 1.1 henley/cm2, 1 MHz x 8 minutes X                Manual Therapy Time/  Technique  Notes/Comments   Supine PROM to left shoulder all planes to tolerance, PROM for IR in sidelying, bolster under legs  X Improved ROM and tolerance. Tightest in ER   STM manually and with IASTM tools to left posterior shoulder and upper trap for tight/painful musculature      STM manually to anterior and lateral shoulder for pain control  X    GH mobs   X    Sidelying scap mobs      Exercises   Sets/  Sec Reps  Notes/Comments   Scapular retraction 3 sec 10 X    Pulleys for shoulder flexion 1 10     Supine vertical towel roll with manual overpressure from therapist        Supine ER with hands behind head for ER stretch 10  sec 5  Vertical towel roll under spine. Patient reports strength to bring elbows to ceiling is limited   Supine dowel yelitza shoulder flexion 90 to tolerance with joint distraction for improved tolerance, horizontal abduction/adduction 1  10 ea X Patient states these stretches feel good   Supine SA punch  2 sec 10 X    Supine shoulder circles CW and CCW 1 10 ea     Biodex UBE at 90 RPM 4 minutes all backwards  4 minutes X    Supine dowel ER  5 sec 5  Initiated. Min cues with good technique. Painful end range but good stretch noted    IR sleeper stretch 15 sec 5  Cues and demonstration for correct technique   Wall slides for flexion and scaption 5 5     Modified corner pec stretch 10 3     Posterior capsule stretch 10 3  Completed in supine   Towel stretch IR  10 sec 3  Initiated. Good stretch noted, weakness                  Activities Time    Notes/Comments   Kinesiotape- Y strip upper trap inhibition, one I strip mechanical correction anterior to posterior shoulder and one I strip for bicep inhibition    Patient states she thinks Kinesiotape helped.                  Specific Interventions Next Treatment: ultrasound as needed for pain control, Kinesiotaping, AROM, AAROM, PROM, rotator cuff strengthening, stretching    Activity/Treatment Tolerance:  [x]  Patient tolerated treatment well  []  Patient limited by fatigue  []  Patient limited by pain   []  Patient limited by other medical complications  []  Other:     Assessment: Patient is progressing towards her goals. Improvement with ROM reported with increased ease reaching behind her. Areas for Improvement: impaired ROM, impaired sensation, impaired strength, and pain  Prognosis: good    GOALS:  Patient Goal: increase strength, get rid of pain with sleeping    Short Term Goals:  Time Frame: 5 weeks   Patient will be independent with UE HEP for increased ease with dressing and doing her hair. Patient will increase AROM left shoulder flexion to 152, extension to 50, abduction to 138, IR to 50 and ER to 65 degrees for increased ease with overhead reaching. Patient will increase PROM left shoulder flexion to 148, extension to 55, abduction to 150, IR to 58 and ER to 70 degrees for increased ease with AROM and hooking her bra. Patient will report pain in her left shoulder no greater than 2/10 with reaching tasks. Long Term Goals:  Time Frame: 8 weeks  1. Patient will improve UEFS to at least 76.  2.  Patient will demonstrate ability to reach overhead for an object with affected extremity without increased pain. 3.  Patient will report increased ease with sleep with no instances of left shoulder pain waking her at night. Patient Education:   []  HEP/Education Completed: Plan of Care, Goals,   Aric Hirsch Access Code for HEP: Access Code: [email or web address]  URL: [email or web address]  Date: 09/15/2022  Prepared by:  Trixie Fatima    Exercises  Seated Scapular Retraction - 2-3 x daily - 7 x weekly - 1 sets - 10 reps  Standing Shoulder Posterior Capsule Stretch - 2-3 x daily - 7 x weekly - 1 sets - 10 reps  Wall [place] - 2 x daily - 7 x weekly - 1 sets - 10 reps - 3 hold  Shoulder Flexion Wall Slide with Towel - 2 x daily - 7 x weekly - 1 sets - 10 reps - 3 hold  Standing Shoulder Abduction Slides at Wall - 2 x daily - 7 x weekly - 1 sets - 10 reps - 3 hold  Doorway Pec Stretch at 60 Elevation - 2-3 x daily - 7 x weekly - 1 sets - 5 reps - 10 hold  Sleeper Stretch - 2 x daily - 7 x weekly - 1 sets - 5 reps - 10-15 hold  9/22/22: Purpose of ultrasound  9/23/22: IR towel stretch, supine ER/IR stretch, supine pec stretch on vertical towel roll  9/29/22: discussed balancing muscles with strengthening posterior musculature  []  No new Education completed  [x]  Reviewed Prior HEP, new schedule given. [x]  Patient verbalized and/or demonstrated understanding of education provided. []  Patient unable to verbalize and/or demonstrate understanding of education provided. Will continue education. [x]  Barriers to learning: none    PLAN:  Treatment Recommendations: Strengthening, Range of Motion, Manual Therapy - Soft Tissue Mobilization, Manual Therapy - Joint Manipulation, Home Exercise Program, and Modalities    []  Plan of care initiated. Plan to see patient 2 times per week for 8 weeks to address the treatment planned outlined above. [x]  Continue with current plan of care  []  Modify plan of care as follows:    []  Hold pending physician visit  []  Discharge    Time In 0947   Time Out 1031   Timed Code Minutes: 44 min   Total Treatment Time: 44 min       Electronically Signed by:  AMANDA Martinez/SHERITA #[id number]

## 2022-10-10 ENCOUNTER — HOSPITAL ENCOUNTER (OUTPATIENT)
Dept: OCCUPATIONAL THERAPY | Age: 57
Setting detail: THERAPIES SERIES
Discharge: HOME | End: 2022-10-10
Payer: COMMERCIAL

## 2022-10-10 PROCEDURE — 97035 APP MDLTY 1+ULTRASOUND EA 15: CPT

## 2022-10-10 PROCEDURE — 97140 MANUAL THERAPY 1/> REGIONS: CPT

## 2022-10-10 PROCEDURE — 97110 THERAPEUTIC EXERCISES: CPT

## 2022-10-10 NOTE — PROGRESS NOTES
[address] THERAPY  [] EVALUATION  [x] DAILY NOTE (LAND) [] DAILY NOTE (AQUATIC ) [] PROGRESS NOTE [] DISCHARGE NOTE    [] [address] - [place]   [] [address]    [] [address] [place]   [x] Nick House    Date: 10/10/2022  Patient Name:  Rosy Everett  : 1965  MRN: [id number]  CSN: [id number]    Referring Practitioner Zayra St DO   Diagnosis Adhesive capsulitis of left shoulder [M75.02]  Incomplete rotator cuff tear or rupture of left shoulder, not specified as traumatic [M75.112]    Treatment Diagnosis Adhesive capsulitis of left shoulder   Date of Evaluation 9/15/22      Functional Outcome Measure Used Upper Extremity Functional Scale   Functional Outcome Score 72/80 (9/15/22)       Insurance: Primary: Payor: Rajat Jesus /  /  / ,   Secondary:    Authorization Information: 90 visits PT/OT/ST combined - none used, no precert required, aquatics covered, modalities covered   Visit # 8, 8/10 for progress note   Visits Allowed:    Recertification Date: November 10, 2022   Physician Follow-Up: 2022   Physician Orders: Script for eval adhesive capsulitis of left shoulder, incomplete tear of left rotator cuff, unspecified whether traumatic   Pertinent History:7 Patient reports about 6 months ago she notices she was having some mobility and strength issues in her left shoulder and when she moved it would catch. No specific injury reported. Patient reports partial tear and a cyst on left shoulder per MRI. Past medical history includes high blood pressure. SUBJECTIVE: Patient reports her shoulder feels tight but she feels she is doing better.         OBJECTIVE:  TREATMENT   Precautions:  none per patient    Pain: 3/10 left anterior shoulder      X in shaded column indicates Activity Completed Today   Modalities Parameters/  Location  Notes/Comments   Ultrasound to L shoulder anterior shoulder for pain control 100% continuous, 1.1 henley/cm2, 1 MHz x 8 minutes X                Manual Therapy Time/  Technique  Notes/Comments   Supine PROM to left shoulder all planes to tolerance, PROM for IR in sidelying, bolster under legs  X ER tight. Patient reports she feels it in posterior shoulder. STM manually and with IASTM tools to left posterior shoulder and upper trap for tight/painful musculature      STM manually to anterior and lateral shoulder for pain control  X    GH mobs   X    Sidelying scap mobs  X    Exercises   Sets/  Sec Reps  Notes/Comments   Scapular retraction 3 sec 10 X    Pulleys for shoulder flexion 1 10     Supine vertical towel roll with manual overpressure from therapist        Supine ER with hands behind head for ER stretch 10  sec 5 X Vertical towel roll under spine. Supine dowel yelitza shoulder flexion 90 to tolerance with joint distraction for improved tolerance, horizontal abduction/adduction 1  10 ea  Patient states these stretches feel good   Supine SA punch  2 sec 10     Supine shoulder circles CW and CCW 1 10 ea     Biodex UBE at 90 RPM 4 minutes all backwards  4 minutes     Supine dowel ER  5 sec 5  Initiated. Min cues with good technique. Painful end range but good stretch noted    IR sleeper stretch 15 sec 5  Cues and demonstration for correct technique   Wall slides for flexion and scaption 5 5     Modified corner pec stretch 10 3 X    Posterior capsule stretch 10 3 X Completed in supine   Towel stretch IR  10 sec 3  Initiated. Good stretch noted, weakness                  Activities Time    Notes/Comments   Kinesiotape- Y strip upper trap inhibition, one I strip mechanical correction anterior to posterior shoulder and one I strip for bicep inhibition   X Patient states she thinks Kinesiotape helped.                  Specific Interventions Next Treatment: ultrasound as needed for pain control, Kinesiotaping, AROM, AAROM, PROM, rotator cuff strengthening, stretching    Activity/Treatment Tolerance:  [x]  Patient tolerated treatment well  []  Patient limited by fatigue  []  Patient limited by pain   []  Patient limited by other medical complications  []  Other:     Assessment: Patient is progressing towards her goals. Tenderness with ER continues. Overall improved ROM noted. Areas for Improvement: impaired ROM, impaired sensation, impaired strength, and pain  Prognosis: good    GOALS:  Patient Goal: increase strength, get rid of pain with sleeping    Short Term Goals:  Time Frame: 5 weeks   Patient will be independent with UE HEP for increased ease with dressing and doing her hair. Patient will increase AROM left shoulder flexion to 152, extension to 50, abduction to 138, IR to 50 and ER to 65 degrees for increased ease with overhead reaching. Patient will increase PROM left shoulder flexion to 148, extension to 55, abduction to 150, IR to 58 and ER to 70 degrees for increased ease with AROM and hooking her bra. Patient will report pain in her left shoulder no greater than 2/10 with reaching tasks. Long Term Goals:  Time Frame: 8 weeks  1. Patient will improve UEFS to at least 76.  2.  Patient will demonstrate ability to reach overhead for an object with affected extremity without increased pain. 3.  Patient will report increased ease with sleep with no instances of left shoulder pain waking her at night. Patient Education:   [x]  HEP/Education Completed: Plan of Care, Goals,   [address] Access Code for HEP: Access Code: [email or web address]  URL: [email or web address]  Date: 09/15/2022  Prepared by:  Jazmin Shell    Exercises  Seated Scapular Retraction - 2-3 x daily - 7 x weekly - 1 sets - 10 reps  Standing Shoulder Posterior Capsule Stretch - 2-3 x daily - 7 x weekly - 1 sets - 10 reps  Wall [place] - 2 x daily - 7 x weekly - 1 sets - 10 reps - 3 hold  Shoulder Flexion Wall Slide with Towel - 2 x daily - 7 x weekly - 1 sets - 10 reps - 3 hold  Standing Shoulder Abduction Slides at Wall - 2 x daily - 7 x weekly - 1 sets - 10 reps - 3 hold  Doorway Pec Stretch at 60 Elevation - 2-3 x daily - 7 x weekly - 1 sets - 5 reps - 10 hold  Sleeper Stretch - 2 x daily - 7 x weekly - 1 sets - 5 reps - 10-15 hold  9/22/22: Purpose of ultrasound  9/23/22: IR towel stretch, supine ER/IR stretch, supine pec stretch on vertical towel roll  9/29/22: discussed balancing muscles with strengthening posterior musculature  10/10/22:  Demonstrated/discussed chin tucks and wall angels to trial  []  No new Education completed  [x]  Reviewed Prior HEP, new schedule given. [x]  Patient verbalized and/or demonstrated understanding of education provided. []  Patient unable to verbalize and/or demonstrate understanding of education provided. Will continue education. [x]  Barriers to learning: none    PLAN:  Treatment Recommendations: Strengthening, Range of Motion, Manual Therapy - Soft Tissue Mobilization, Manual Therapy - Joint Manipulation, Home Exercise Program, and Modalities    []  Plan of care initiated. Plan to see patient 2 times per week for 8 weeks to address the treatment planned outlined above. [x]  Continue with current plan of care  []  Modify plan of care as follows:    []  Hold pending physician visit  []  Discharge    Time In 0813   Time Out 0856   Timed Code Minutes: 43 min   Total Treatment Time: 43 min       Electronically Signed by:  Mick Jiménez OTR/SHERITA #[id number]

## 2022-10-13 ENCOUNTER — HOSPITAL ENCOUNTER (OUTPATIENT)
Dept: OCCUPATIONAL THERAPY | Age: 57
Setting detail: THERAPIES SERIES
Discharge: HOME | End: 2022-10-13
Payer: COMMERCIAL

## 2022-10-13 PROCEDURE — 97035 APP MDLTY 1+ULTRASOUND EA 15: CPT

## 2022-10-13 PROCEDURE — 97110 THERAPEUTIC EXERCISES: CPT

## 2022-10-13 NOTE — PROGRESS NOTES
[address] THERAPY  [] EVALUATION  [x] DAILY NOTE (LAND) [] DAILY NOTE (AQUATIC ) [] PROGRESS NOTE [] DISCHARGE NOTE    [] [address] - [place]   [] [address]    [] [address] [facility]   [x] Amanda Vora    Date: 10/13/2022  Patient Name:  Kye Crouch  : 1965  MRN: [id number]  CSN: [id number]    Referring Practitioner Jose Gan DO   Diagnosis Adhesive capsulitis of left shoulder [M75.02]  Incomplete rotator cuff tear or rupture of left shoulder, not specified as traumatic [M75.112]    Treatment Diagnosis Adhesive capsulitis of left shoulder   Date of Evaluation 9/15/22      Functional Outcome Measure Used Upper Extremity Functional Scale   Functional Outcome Score 72/80 (9/15/22) 69/80 (10/13/22)      Insurance: Primary: Payor: Rosalba Ma /  /  / ,   Secondary:    Authorization Information: 90 visits PT/OT/ST combined - none used, no precert required, aquatics covered, modalities covered   Visit # 9, 9/10 for progress note, PN completed 10/13/22   Visits Allowed: 30   Recertification Date: November 10, 2022   Physician Follow-Up: 2022   Physician Orders: Script for eval adhesive capsulitis of left shoulder, incomplete tear of left rotator cuff, unspecified whether traumatic   Pertinent History:7 Patient reports about 6 months ago she notices she was having some mobility and strength issues in her left shoulder and when she moved it would catch. No specific injury reported. Patient reports partial tear and a cyst on left shoulder per MRI. Past medical history includes high blood pressure. SUBJECTIVE: Patient reports she has a lot of crunching in her shoulder this morning. She states she had to take some ibuprofen this morning. She states her shoulder does not feel as tight and overall ROM is better.   Patient indicates the pain can move from anterior to posterior shoulder depending what she has been doing. OBJECTIVE:  TREATMENT   Precautions:  none per patient    Pain: 2-3/10 left posterior shoulder      X in shaded column indicates Activity Completed Today   Modalities Parameters/  Location  Notes/Comments   Ultrasound to L shoulder posterior shoulder for pain control 100% continuous, 1.1 henley/cm2, 1 MHz x 8 minutes X                Manual Therapy Time/  Technique  Notes/Comments   Supine PROM to left shoulder all planes to tolerance, PROM for IR in sidelying, bolster under legs  X ER tight. Most tenderness in ER. STM manually and with IASTM tools to left posterior shoulder and upper trap for tight/painful musculature      STM manually to anterior and lateral shoulder for pain control  X    GH mobs   X    Sidelying scap mobs      Exercises   Sets/  Sec Reps  Notes/Comments   Scapular retraction 3 sec 10 X    Pulleys for shoulder flexion 1 15 X    Supine vertical towel roll with manual overpressure from therapist        Supine ER with hands behind head for ER stretch 10  sec 5  Vertical towel roll under spine. Supine dowel yelitza shoulder flexion 90 to tolerance with joint distraction for improved tolerance, horizontal abduction/adduction 1  10 ea  Patient states these stretches feel good   Supine SA punch  2 sec 10     Supine shoulder circles CW and CCW 1 10 ea     Biodex UBE at 90 RPM 4 minutes all backwards  4 minutes     Supine dowel ER  5 sec 5  Initiated. Min cues with good technique. Painful end range but good stretch noted    IR sleeper stretch 15 sec 5  Cues and demonstration for correct technique   Wall slides for flexion and scaption 5 5     Modified corner pec stretch 10 3     Posterior capsule stretch 10 3  Completed in supine   Towel stretch IR  10 sec 3  Initiated.  Good stretch noted, weakness    Prone horizontal abduction, scaption and extension with palm flat 2 sec 7 X           Activities Time    Notes/Comments   Kinesiotape- Y strip upper trap inhibition, one I strip mechanical correction anterior to posterior shoulder and one I strip for bicep inhibition    Patient states she thinks Kinesiotape helped. Goal assessment completed  X            Specific Interventions Next Treatment: ultrasound as needed for pain control, Kinesiotaping, AROM, AAROM, PROM, rotator cuff strengthening, stretching    Activity/Treatment Tolerance:  [x]  Patient tolerated treatment well  []  Patient limited by fatigue  []  Patient limited by pain   []  Patient limited by other medical complications  []  Other:     Assessment: Patient has made good progress towards her goals. Overall improved ROM noted with exception of (passive) ER which is painful/tender yet. Patient indicates overall pain is less. She states she has more crepitus in her shoulder but feels it is moving more. She indicates her pain changes from the anterior to posterior shoulder depending what she does. Patient has physician appointment tomorrow. Patient indicates she is not sure that she wants to have a cortisone injection as she is anxious about this. Patient would benefit from continued OT to increase functional ROM and strength, especially scapular strengthening and decrease pain for ease with dressing and reaching and bringing her arm back down from reaching tasks. Areas for Improvement: impaired ROM, impaired sensation, impaired strength, and pain  Prognosis: good    GOALS:  Patient Goal: increase strength, get rid of pain with sleeping    Short Term Goals:  Time Frame: 5 weeks  Patient will be independent with UE HEP for increased ease with dressing and doing her hair. GOAL MET. Patient reports doing her HEP 3-4 times a day. Patient reports increased ease with doing her hair. She states she still modifies dressing using her right arm to help with her left arm. REVISED GOAL:  Patient will be independent with UE HEP updates for increased ease with dressing and doing her hair.    Patient will increase AROM left shoulder flexion to 152, extension to 50, abduction to 138, IR to 50 and ER to 65 degrees for increased ease with overhead reaching. GOAL PARTIALLY MET. AROM left shoulder flexion = 150, extension = 50, abduction = 145, IR = 55 and ER = 65 degrees. REVISED GOAL:  Patient will increase AROM left shoulder flexion to 155, extension to 55, abduction to 150, IR to 60 and ER to 70 degrees for increased ease with overhead reaching. Patient will increase PROM left shoulder flexion to 148, extension to 55, abduction to 150, IR to and ER to 70 degrees for increased ease with AROM and hooking her bra. GOAL PARTIALLY MET. PROM left shoulder flexion = 163, extension = 60, abduction = 150, IR = 75 and ER = 58 degrees. REVISED GOAL:  Patient will increase PROM left shoulder abduction to 155, ER to 70 degrees for increased ease with AROM and doing her hair. Patient will report pain in her eft shoulder no greater than 2/10 with reaching tasks. GOAL NOT MET. Patient reports pain is more with bringing her arm back down after reaching at 3/10. CONTINUE GOAL. Long Term Goals:  Time Frame: 8 weeks  1. Patient will improve UEFS to at least 76. GOAL NOT MET. UEFS = 69/80. CONTINUE GOAL. 2.  Patient will demonstrate ability to reach overhead for an object with affected extremity without increased pain. CONTINUE GOAL. Patient reports pain is more with bringing her arm down. 3.  Patient will report increased ease with sleep with no instances of left shoulder pain waking her at night. GOAL NOT MET. Patient reports left shoulder pain wakes her up at night yet. 3 times a night. CONTINUE GOAL. Patient Education:   [x]  HEP/Education Completed: Plan of Care, Goals, plan of care  [address] [place] [address] Access Code for HEP: Access Code: [email or web address]  URL: [email or web address]  Date: 09/15/2022  Prepared by:  Sushant Putnam    Exercises  Seated Scapular Retraction - 2-3 x daily - 7 x weekly - 1 sets - 10 reps  Standing Shoulder Posterior Capsule Stretch - 2-3 x daily - 7 x weekly - 1 sets - 10 reps  Wall [place] - 2 x daily - 7 x weekly - 1 sets - 10 reps - 3 hold  Shoulder Flexion Wall Slide with Towel - 2 x daily - 7 x weekly - 1 sets - 10 reps - 3 hold  Standing Shoulder Abduction Slides at Wall - 2 x daily - 7 x weekly - 1 sets - 10 reps - 3 hold  Doorway Pec Stretch at 60 Elevation - 2-3 x daily - 7 x weekly - 1 sets - 5 reps - 10 hold  Sleeper Stretch - 2 x daily - 7 x weekly - 1 sets - 5 reps - 10-15 hold  9/22/22: Purpose of ultrasound  9/23/22: IR towel stretch, supine ER/IR stretch, supine pec stretch on vertical towel roll  9/29/22: discussed balancing muscles with strengthening posterior musculature  10/10/22:  Demonstrated/discussed chin tucks and wall angels to trial  10/13/22: Access Code: [email or web address]  URL: [email or web address]  Date: 10/13/2022  Prepared by: Jazmin Shell    Exercises  Prone Single Arm Shoulder Horizontal Abduction with Scapular Retraction and Palm Down - 1-2 x daily - 7 x weekly - 1 sets - 5-10 reps - 2 hold  Prone Shoulder Extension - Single Arm - 1-2 x daily - 7 x weekly - 1 sets - 5-10 reps - 2 hold  Prone Single Arm Shoulder Scaption Palm Down - 1 x daily - 7 x weekly - 1 sets - 5-10 reps - 2 hold    []  No new Education completed  [x]  Reviewed Prior HEP, new schedule given. [x]  Patient verbalized and/or demonstrated understanding of education provided. []  Patient unable to verbalize and/or demonstrate understanding of education provided. Will continue education. [x]  Barriers to learning: none    PLAN:  Treatment Recommendations: Strengthening, Range of Motion, Manual Therapy - Soft Tissue Mobilization, Manual Therapy - Joint Manipulation, Home Exercise Program, and Modalities    []  Plan of care initiated. Plan to see patient 2 times per week for 8 weeks to address the treatment planned outlined above.   [x]  Continue with current plan of care  []  Modify plan of care as follows:    []  Hold pending physician visit  []  Discharge    Time In 0904   Time Out 0952   Timed Code Minutes: 48 min   Total Treatment Time: 48 min       Electronically Signed by:  Tomasa Chacko OTR/SHERITA #[id number]

## 2022-10-18 ENCOUNTER — HOSPITAL ENCOUNTER (OUTPATIENT)
Dept: OCCUPATIONAL THERAPY | Age: 57
Setting detail: THERAPIES SERIES
Discharge: HOME | End: 2022-10-18
Payer: COMMERCIAL

## 2022-10-18 PROCEDURE — 97110 THERAPEUTIC EXERCISES: CPT

## 2022-10-18 NOTE — PROGRESS NOTES
[address] THERAPY  [] EVALUATION  [x] DAILY NOTE (LAND) [] DAILY NOTE (AQUATIC ) [] PROGRESS NOTE [] DISCHARGE NOTE    [] [address] - [place]   [] [address]    [] [address] [facility]   [x] Brooklyn Lee    Date: 10/18/2022  Patient Name:  Aditi Fraser  : 1965  MRN: [id number]  CSN: [id number]    Referring Practitioner Parker Madrid DO   Diagnosis Adhesive capsulitis of left shoulder [M75.02]  Incomplete rotator cuff tear or rupture of left shoulder, not specified as traumatic [M75.112]    Treatment Diagnosis Adhesive capsulitis of left shoulder   Date of Evaluation 9/15/22      Functional Outcome Measure Used Upper Extremity Functional Scale   Functional Outcome Score 72/80 (9/15/22) 69/80 (10/13/22)      Insurance: Primary: Payor: Gabi Resendiz /  /  / ,   Secondary:    Authorization Information: 90 visits PT/OT/ST combined - none used, no precert required, aquatics covered, modalities covered   Visit # 10, 1/10 for progress note, PN completed 10/13/22   Visits Allowed: 30   Recertification Date: November 10, 2022   Physician Follow-Up: 2022   Physician Orders: Script for eval adhesive capsulitis of left shoulder, incomplete tear of left rotator cuff, unspecified whether traumatic   Pertinent History:7 Patient reports about 6 months ago she notices she was having some mobility and strength issues in her left shoulder and when she moved it would catch. No specific injury reported. Patient reports partial tear and a cyst on left shoulder per MRI. Past medical history includes high blood pressure. SUBJECTIVE: Patient reports she see the doctor and he thought she was doing well and does not have to follow up with the physician.          OBJECTIVE:  TREATMENT   Precautions:  none per patient    Pain: 2/10 left posterior shoulder      X in shaded column indicates Activity Completed Today   Modalities Parameters/  Location  Notes/Comments   Ultrasound to L shoulder posterior shoulder for pain control 100% continuous, 1.1 henley/cm2, 1 MHz x 8 minutes  Patient declined ultrasound today. Manual Therapy Time/  Technique  Notes/Comments   Supine PROM to left shoulder all planes to tolerance, PROM for IR in sidelying, bolster under legs  X ER tight but improving. Most tenderness in ER. STM manually and with IASTM tools to left posterior shoulder and upper trap for tight/painful musculature      STM manually to anterior and lateral shoulder for pain control  X    GH mobs   X    Sidelying scap mobs      Exercises   Sets/  Sec Reps  Notes/Comments   Scapular retraction 3 sec 10 X    Pulleys for shoulder flexion 1 15 X    Biodex UBE at 90 3 minutes backwards and 2 minutes forward  5 min X    Supine vertical towel roll with manual overpressure from therapist        Supine ER with hands behind head for ER stretch 10  sec 5  Vertical towel roll under spine. Supine dowel yelitza shoulder flexion 90 to tolerance with joint distraction for improved tolerance, horizontal abduction/adduction 1  10 ea  Patient states these stretches feel good   Supine SA punch  2 sec 10 X    Supine shoulder circles CW and CCW 1 10 ea X    Supine dowel ER  5 sec 5  Initiated. Min cues with good technique. Painful end range but good stretch noted    IR sleeper stretch 15 sec 5  Cues and demonstration for correct technique   Wall slides for flexion and scaption 5 5     Modified corner pec stretch 10 3     Posterior capsule stretch 10 3  Completed in supine   Towel stretch IR  10 sec 3  Initiated.  Good stretch noted, weakness    Peach theraband for rows, shoulder flexion, extension, IR and ER with towel roll under arm 1 10 X Issued peach theraband for HEP   Supine peach theraband for horizontal abduction, diaganols down and diaganols up 1 10 ea X    Prone horizontal abduction, scaption and extension with palm flat 2 sec 10 X Cues for technique, 15 reps for horizontal abduction          Activities Time    Notes/Comments   Kinesiotape- Y strip upper trap inhibition, one I strip mechanical correction anterior to posterior shoulder and one I strip for bicep inhibition    Patient states she thinks Kinesiotape helped. Goal assessment completed              Specific Interventions Next Treatment: ultrasound as needed for pain control, Kinesiotaping, AROM, AAROM, PROM, rotator cuff strengthening, stretching    Activity/Treatment Tolerance:  [x]  Patient tolerated treatment well  []  Patient limited by fatigue  []  Patient limited by pain   []  Patient limited by other medical complications  []  Other:     Assessment: Patient is progressing towards her  goals. Patient tolerating increased strengthening well. Areas for Improvement: impaired ROM, impaired sensation, impaired strength, and pain  Prognosis: good    GOALS:  Patient Goal: increase strength, get rid of pain with sleeping    Short Term Goals:  Time Frame: 5 weeks  Patient will be independent with UE HEP for increased ease with dressing and doing her hair. GOAL MET. Patient reports doing her HEP 3-4 times a day. Patient reports increased ease with doing her hair. She states she still modifies dressing using her right arm to help with her left arm. REVISED GOAL:  Patient will be independent with UE HEP updates for increased ease with dressing and doing her hair. Patient will increase AROM left shoulder flexion to 152, extension to 50, abduction to 138, IR to 50 and ER to 65 degrees for increased ease with overhead reaching. GOAL PARTIALLY MET. AROM left shoulder flexion = 150, extension = 50, abduction = 145, IR = 55 and ER = 65 degrees. REVISED GOAL:  Patient will increase AROM left shoulder flexion to 155, extension to 55, abduction to 150, IR to 60 and ER to 70 degrees for increased ease with overhead reaching.     Patient will increase PROM left shoulder flexion to 148, extension to 55, abduction to 150, IR to and ER to 70 degrees for increased ease with AROM and hooking her bra. GOAL PARTIALLY MET. PROM left shoulder flexion = 163, extension = 60, abduction = 150, IR = 75 and ER = 58 degrees. REVISED GOAL:  Patient will increase PROM left shoulder abduction to 155, ER to 70 degrees for increased ease with AROM and doing her hair. Patient will report pain in her eft shoulder no greater than 2/10 with reaching tasks. GOAL NOT MET. Patient reports pain is more with bringing her arm back down after reaching at 3/10. CONTINUE GOAL. Long Term Goals:  Time Frame: 8 weeks  1. Patient will improve UEFS to at least 76. GOAL NOT MET. UEFS = 69/80. CONTINUE GOAL. 2.  Patient will demonstrate ability to reach overhead for an object with affected extremity without increased pain. CONTINUE GOAL. Patient reports pain is more with bringing her arm down. 3.  Patient will report increased ease with sleep with no instances of left shoulder pain waking her at night. GOAL NOT MET. Patient reports left shoulder pain wakes her up at night yet. 3 times a night. CONTINUE GOAL. Patient Education:   [x]  HEP/Education Completed: Plan of Care, Goals, plan of care  [address] Access Code for HEP: Access Code: [email or web address]  URL: [email or web address]  Date: 09/15/2022  Prepared by:  Jazmin Shell    Exercises  Seated Scapular Retraction - 2-3 x daily - 7 x weekly - 1 sets - 10 reps  Standing Shoulder Posterior Capsule Stretch - 2-3 x daily - 7 x weekly - 1 sets - 10 reps  Wall [place] - 2 x daily - 7 x weekly - 1 sets - 10 reps - 3 hold  Shoulder Flexion Wall Slide with Towel - 2 x daily - 7 x weekly - 1 sets - 10 reps - 3 hold  Standing Shoulder Abduction Slides at Wall - 2 x daily - 7 x weekly - 1 sets - 10 reps - 3 hold  Doorway Pec Stretch at 60 Elevation - 2-3 x daily - 7 x weekly - 1 sets - 5 reps - 10 hold  Sleeper Stretch - 2 x daily - 7 x weekly - 1 sets - 5 reps - 10-15 hold  9/22/22: Purpose of ultrasound  9/23/22: IR towel stretch, supine ER/IR stretch, supine pec stretch on vertical towel roll  9/29/22: discussed balancing muscles with strengthening posterior musculature  10/10/22:  Demonstrated/discussed chin tucks and wall angels to trial  10/13/22: Access Code: [email or web address]  URL: [email or web address]  Date: 10/13/2022  Prepared by: Filbert Maricarmen    Exercises  Prone Single Arm Shoulder Horizontal Abduction with Scapular Retraction and Palm Down - 1-2 x daily - 7 x weekly - 1 sets - 5-10 reps - 2 hold  Prone Shoulder Extension - Single Arm - 1-2 x daily - 7 x weekly - 1 sets - 5-10 reps - 2 hold  Prone Single Arm Shoulder Scaption Palm Down - 1 x daily - 7 x weekly - 1 sets - 5-10 reps - 2 hold  10/18/22: Access Code: [email or web address]  URL: [email or web address]  Date: 10/18/2022  Prepared by: Filbert Maricarmen    Exercises  Scapular Retraction with Resistance - 2 x daily - 7 x weekly - 1 sets - 10 reps  Scapular Retraction with Resistance Advanced - 2 x daily - 7 x weekly - 1 sets - 10 reps  Standing Shoulder Internal Rotation with Anchored Resistance - 2 x daily - 7 x weekly - 1 sets - 10 reps  Shoulder External Rotation with Anchored Resistance - 2 x daily - 7 x weekly - 1 sets - 10 reps  Standing Single Arm Shoulder Flexion with Posterior Anchored Resistance - 2 x daily - 7 x weekly - 1 sets - 10 reps    []  No new Education completed  [x]  Reviewed Prior HEP, new schedule given. [x]  Patient verbalized and/or demonstrated understanding of education provided. []  Patient unable to verbalize and/or demonstrate understanding of education provided. Will continue education. [x]  Barriers to learning: none    PLAN:  Treatment Recommendations: Strengthening, Range of Motion, Manual Therapy - Soft Tissue Mobilization, Manual Therapy - Joint Manipulation, Home Exercise Program, and Modalities    []  Plan of care initiated.   Plan to see patient 2 times per week for 8 weeks to address the treatment planned outlined above. [x]  Continue with current plan of care  []  Modify plan of care as follows:    []  Hold pending physician visit  []  Discharge    Time In 0757   Time Out 0838   Timed Code Minutes: 41 min   Total Treatment Time: 41 min       Electronically Signed by:  Drea Pickett OTR/SHERITA #[id number]

## 2022-10-20 ENCOUNTER — APPOINTMENT (OUTPATIENT)
Dept: OCCUPATIONAL THERAPY | Age: 57
End: 2022-10-20
Payer: COMMERCIAL

## 2022-10-24 ENCOUNTER — HOSPITAL ENCOUNTER (OUTPATIENT)
Dept: OCCUPATIONAL THERAPY | Age: 57
Setting detail: THERAPIES SERIES
Discharge: HOME | End: 2022-10-24
Payer: COMMERCIAL

## 2022-10-24 PROCEDURE — 97110 THERAPEUTIC EXERCISES: CPT

## 2022-10-24 NOTE — PROGRESS NOTES
[address] THERAPY  [] EVALUATION  [x] DAILY NOTE (LAND) [] DAILY NOTE (AQUATIC ) [] PROGRESS NOTE [] DISCHARGE NOTE    [] [address] - [place]   [] [address]    [] [address] [facility]   [x] Ameena Arellano    Date: 10/24/2022  Patient Name:  Reena Mosley  : 1965  MRN: [id number]  CSN: [id number]    Referring Practitioner Dima Son, DO   Diagnosis Adhesive capsulitis of left shoulder [M75.02]  Incomplete rotator cuff tear or rupture of left shoulder, not specified as traumatic [M75.112]    Treatment Diagnosis Adhesive capsulitis of left shoulder   Date of Evaluation 9/15/22      Functional Outcome Measure Used Upper Extremity Functional Scale   Functional Outcome Score 72/80 (9/15/22) 69/80 (10/13/22)      Insurance: Primary: Payor: Laz Uribe /  /  / ,   Secondary:    Authorization Information: 90 visits PT/OT/ST combined - none used, no precert required, aquatics covered, modalities covered   Visit # 11, 2/10 for progress note, PN completed 10/13/22   Visits Allowed: 30   Recertification Date: November 10, 2022   Physician Follow-Up: 2022   Physician Orders: Script for eval adhesive capsulitis of left shoulder, incomplete tear of left rotator cuff, unspecified whether traumatic   Pertinent History:7 Patient reports about 6 months ago she notices she was having some mobility and strength issues in her left shoulder and when she moved it would catch. No specific injury reported. Patient reports partial tear and a cyst on left shoulder per MRI. Past medical history includes high blood pressure. SUBJECTIVE: Patient reports some morning stiffness today. Patient states she thinks she is getting more extension.        OBJECTIVE:  TREATMENT   Precautions:  none per patient    Pain: 2-3/10 left posterior shoulder      X in shaded column indicates Activity Completed Today   Modalities Parameters/  Location  Notes/Comments   Ultrasound to L shoulder posterior shoulder for pain control 100% continuous, 1.1 henley/cm2, 1 MHz x 8 minutes  Patient declined ultrasound today. Manual Therapy Time/  Technique  Notes/Comments   Supine PROM to left shoulder all planes to tolerance with bolster under legs, PROM for IR in sidelying  X ER tight but improving. Most tenderness in ER. STM manually and with IASTM tools to left posterior shoulder and upper trap for tight/painful musculature      STM manually to anterior and lateral shoulder for pain control  X    GH mobs   X    Sidelying scap mobs      Exercises   Sets/  Sec Reps  Notes/Comments   Scapular retraction 3 sec 10 X    Pulleys for shoulder flexion 1 15 X    Biodex UBE at 85 3 minutes backwards and 2 minutes forward  5 min X    Supine vertical towel roll with manual overpressure from therapist        Supine ER with hands behind head for ER stretch 10  sec 5  Vertical towel roll under spine. Supine dowel yelitza shoulder flexion 90 to tolerance with joint distraction for improved tolerance, horizontal abduction/adduction 1  10 ea  Patient states these stretches feel good   Supine SA punch  2 sec 10 X    Supine shoulder circles CW and CCW 1 10 ea X    Supine dowel ER  5 sec 5  Initiated. Min cues with good technique. Painful end range but good stretch noted    IR sleeper stretch 15 sec 5  Cues and demonstration for correct technique   Wall slides for flexion and scaption 5 5     Modified corner pec stretch 10 3     Posterior capsule stretch 10 3  Completed in supine   Towel stretch IR  10 sec 3  Initiated.  Good stretch noted, weakness    Peach theraband for rows, shoulder flexion, extension, IR and ER with towel roll under arm 1 10 X    Supine peach theraband for horizontal abduction, diaganols down and diaganols up 1 15 ea X 10 for diagonals up, tenderness with diagonals up in anterior shoulder   Prone horizontal abduction, scaption and extension with palm flat 2 sec 10 X Cues for technique          Activities Time    Notes/Comments   Kinesiotape- Y strip upper trap inhibition, one I strip mechanical correction anterior to posterior shoulder and one I strip for bicep inhibition    Patient states she thinks Kinesiotape helped. Goal assessment completed              Specific Interventions Next Treatment: ultrasound as needed for pain control, Kinesiotaping, AROM, AAROM, PROM, rotator cuff strengthening, stretching    Activity/Treatment Tolerance:  [x]  Patient tolerated treatment well  []  Patient limited by fatigue  []  Patient limited by pain   []  Patient limited by other medical complications  []  Other:     Assessment: Patient is progressing towards her  goals. Patient tolerating increased strengthening well. Areas for Improvement: impaired ROM, impaired sensation, impaired strength, and pain  Prognosis: good    GOALS:  Patient Goal: increase strength, get rid of pain with sleeping    Short Term Goals:  Time Frame: 5 weeks  Patient will be independent with UE HEP for increased ease with dressing and doing her hair. GOAL MET. Patient reports doing her HEP 3-4 times a day. Patient reports increased ease with doing her hair. She states she still modifies dressing using her right arm to help with her left arm. REVISED GOAL:  Patient will be independent with UE HEP updates for increased ease with dressing and doing her hair. Patient will increase AROM left shoulder flexion to 152, extension to 50, abduction to 138, IR to 50 and ER to 65 degrees for increased ease with overhead reaching. GOAL PARTIALLY MET. AROM left shoulder flexion = 150, extension = 50, abduction = 145, IR = 55 and ER = 65 degrees. REVISED GOAL:  Patient will increase AROM left shoulder flexion to 155, extension to 55, abduction to 150, IR to 60 and ER to 70 degrees for increased ease with overhead reaching.     Patient will increase PROM left shoulder flexion to 148, extension to 55, abduction to 150, IR to and ER to 70 degrees for increased ease with AROM and hooking her bra. GOAL PARTIALLY MET. PROM left shoulder flexion = 163, extension = 60, abduction = 150, IR = 75 and ER = 58 degrees. REVISED GOAL:  Patient will increase PROM left shoulder abduction to 155, ER to 70 degrees for increased ease with AROM and doing her hair. Patient will report pain in her eft shoulder no greater than 2/10 with reaching tasks. GOAL NOT MET. Patient reports pain is more with bringing her arm back down after reaching at 3/10. CONTINUE GOAL. Long Term Goals:  Time Frame: 8 weeks  1. Patient will improve UEFS to at least 76. GOAL NOT MET. UEFS = 69/80. CONTINUE GOAL. 2.  Patient will demonstrate ability to reach overhead for an object with affected extremity without increased pain. CONTINUE GOAL. Patient reports pain is more with bringing her arm down. 3.  Patient will report increased ease with sleep with no instances of left shoulder pain waking her at night. GOAL NOT MET. Patient reports left shoulder pain wakes her up at night yet. 3 times a night. CONTINUE GOAL. Patient Education:   []  HEP/Education Completed: Plan of Care, Goals, plan of care  [address] Access Code for HEP: Access Code: [email or web address]  URL: [email or web address]  Date: 09/15/2022  Prepared by:  Lurdes Little    Exercises  Seated Scapular Retraction - 2-3 x daily - 7 x weekly - 1 sets - 10 reps  Standing Shoulder Posterior Capsule Stretch - 2-3 x daily - 7 x weekly - 1 sets - 10 reps  Wall [place] - 2 x daily - 7 x weekly - 1 sets - 10 reps - 3 hold  Shoulder Flexion Wall Slide with Towel - 2 x daily - 7 x weekly - 1 sets - 10 reps - 3 hold  Standing Shoulder Abduction Slides at Wall - 2 x daily - 7 x weekly - 1 sets - 10 reps - 3 hold  Doorway Pec Stretch at 60 Elevation - 2-3 x daily - 7 x weekly - 1 sets - 5 reps - 10 hold  Sleeper Stretch - 2 x daily - 7 x weekly - 1 sets - 5 reps - 10-15 hold  9/22/22: Purpose of ultrasound  9/23/22: IR towel stretch, supine ER/IR stretch, supine pec stretch on vertical towel roll  9/29/22: discussed balancing muscles with strengthening posterior musculature  10/10/22:  Demonstrated/discussed chin tucks and wall angels to trial  10/13/22: Access Code: [email or web address]  URL: [email or web address]  Date: 10/13/2022  Prepared by: Geoffry Sober    Exercises  Prone Single Arm Shoulder Horizontal Abduction with Scapular Retraction and Palm Down - 1-2 x daily - 7 x weekly - 1 sets - 5-10 reps - 2 hold  Prone Shoulder Extension - Single Arm - 1-2 x daily - 7 x weekly - 1 sets - 5-10 reps - 2 hold  Prone Single Arm Shoulder Scaption Palm Down - 1 x daily - 7 x weekly - 1 sets - 5-10 reps - 2 hold  10/18/22: Access Code: [email or web address]  URL: [email or web address]  Date: 10/18/2022  Prepared by: Geoffry Sober    Exercises  Scapular Retraction with Resistance - 2 x daily - 7 x weekly - 1 sets - 10 reps  Scapular Retraction with Resistance Advanced - 2 x daily - 7 x weekly - 1 sets - 10 reps  Standing Shoulder Internal Rotation with Anchored Resistance - 2 x daily - 7 x weekly - 1 sets - 10 reps  Shoulder External Rotation with Anchored Resistance - 2 x daily - 7 x weekly - 1 sets - 10 reps  Standing Single Arm Shoulder Flexion with Posterior Anchored Resistance - 2 x daily - 7 x weekly - 1 sets - 10 reps    []  No new Education completed  [x]  Reviewed Prior HEP. [x]  Patient verbalized and/or demonstrated understanding of education provided. []  Patient unable to verbalize and/or demonstrate understanding of education provided. Will continue education. [x]  Barriers to learning: none    PLAN:  Treatment Recommendations: Strengthening, Range of Motion, Manual Therapy - Soft Tissue Mobilization, Manual Therapy - Joint Manipulation, Home Exercise Program, and Modalities    []  Plan of care initiated.   Plan to see patient 2 times per week for 8 weeks to address the treatment planned outlined above. [x]  Continue with current plan of care  []  Modify plan of care as follows:    []  Hold pending physician visit  []  Discharge    Time In 0732   Time Out 0814   Timed Code Minutes: 42 min   Total Treatment Time: 42 min       Electronically Signed by:  AMANDA Dias/SHERITA #[id number]

## 2022-10-28 ENCOUNTER — HOSPITAL ENCOUNTER (OUTPATIENT)
Dept: OCCUPATIONAL THERAPY | Age: 57
Setting detail: THERAPIES SERIES
Discharge: HOME | End: 2022-10-28
Payer: COMMERCIAL

## 2022-10-28 PROCEDURE — 97140 MANUAL THERAPY 1/> REGIONS: CPT

## 2022-10-28 PROCEDURE — 97110 THERAPEUTIC EXERCISES: CPT

## 2022-10-28 NOTE — PROGRESS NOTES
[address] THERAPY  [] EVALUATION  [x] DAILY NOTE (LAND) [] DAILY NOTE (AQUATIC ) [] PROGRESS NOTE [] DISCHARGE NOTE    [] [address] - [place]   [] [address]    [] [address] [facility]   [x] Brooklyn Lee    Date: 10/28/2022  Patient Name:  Aditi Fraser  : 1965  MRN: [id number]  CSN: [id number]    Referring Practitioner Parker Madrid DO   Diagnosis Adhesive capsulitis of left shoulder [M75.02]  Incomplete rotator cuff tear or rupture of left shoulder, not specified as traumatic [M75.112]    Treatment Diagnosis Adhesive capsulitis of left shoulder   Date of Evaluation 9/15/22      Functional Outcome Measure Used Upper Extremity Functional Scale   Functional Outcome Score 72/80 (9/15/22) 69/80 (10/13/22)      Insurance: Primary: Payor: Gabi Resendiz /  /  / ,   Secondary:    Authorization Information: 90 visits PT/OT/ST combined - none used, no precert required, aquatics covered, modalities covered   Visit # 12, 3/10 for progress note, PN completed 10/13/22   Visits Allowed:    Recertification Date: November 10, 2022   Physician Follow-Up: PRN   Physician Orders: Script for eval adhesive capsulitis of left shoulder, incomplete tear of left rotator cuff, unspecified whether traumatic   Pertinent History:7 Patient reports about 6 months ago she notices she was having some mobility and strength issues in her left shoulder and when she moved it would catch. No specific injury reported. Patient reports partial tear and a cyst on left shoulder per MRI. Past medical history includes high blood pressure. SUBJECTIVE: Patient reports her shoulder has been more tolerable this date, reports shaking when trying to abduct her shoulder at times. Pt reports \"I feel like I've made lots of progress since beginning therapy. I saw my doctor again and I declined a steroid injection when I saw him.  He said there was nothing else he could do for me but that he would let me continue therapy if I still felt like I needed it\"      OBJECTIVE:  TREATMENT   Precautions:  none per patient    Pain: 0/10 at rest, 2-3/10 left posterior shoulder sharp pains that go away quickly     X in shaded column indicates Activity Completed Today   Modalities Parameters/  Location  Notes/Comments   Ultrasound to L shoulder posterior shoulder for pain control 100% continuous, 1.1 henley/cm2, 1 MHz x 8 minutes  Patient declined ultrasound today. Manual Therapy Time/  Technique  Notes/Comments   Supine PROM to left shoulder all planes to tolerance, PROM for IR in sidelying  X Continued tightness and min pain reported with ER but improving   STM manually to left posterior shoulder and upper trap for tight/painful musculature  X    STM manually to anterior and lateral shoulder for pain control  X    GH mobs   X    Sidelying scap mobs  X    Exercises   Sets/  Sec Reps  Notes/Comments   Scapular retraction 3 sec 10 X No pain reported this date   Pulleys for shoulder flexion 1 15 X    Biodex UBE at 85 3 minutes backwards and 2 minutes forward  5 min X    Supine vertical towel roll with manual overpressure from therapist        Supine ER with hands behind head for ER stretch 10  sec 5  Vertical towel roll under spine. Supine dowel yelitza shoulder flexion 90 to tolerance with joint distraction for improved tolerance, horizontal abduction/adduction 1  10 ea  Patient states these stretches feel good   Supine SA punch  2 sec 10 X    Supine shoulder circles CW and CCW 1 10 ea     Supine dowel ER  5 sec 5  Initiated. Min cues with good technique. Painful end range but good stretch noted    IR sleeper stretch 15 sec 5  Cues and demonstration for correct technique   Wall slides for flexion and scaption 5 5     Modified corner pec stretch 10 3     Posterior capsule stretch 10 3 X Manual by therapist this date    Towel stretch IR  10 sec 3  Initiated.  Good stretch noted, weakness    Orange theraband for rows, shoulder flexion, extension, IR and ER with towel roll under arm 1 10 X    Supine orange theraband for horizontal abduction, diaganols down and diaganols up 1 10 ea X 10 for diagonals up, tenderness with diagonals up in anterior shoulder   Prone horizontal abduction, scaption and extension with palm flat 2 sec 10  Cues for technique   Sidelying ER with towel at elbow 2 sec 10 X Initiated this date. Sidelying Abduction with palm forward 1 10 X Initiated this date. for distraction of humerus         Activities Time    Notes/Comments   Kinesiotape- Y strip upper trap inhibition, one I strip mechanical correction anterior to posterior shoulder and one I strip for bicep inhibition    Patient states she thinks Kinesiotape helped. Goal assessment completed              Specific Interventions Next Treatment: ultrasound as needed for pain control, Kinesiotaping, AROM, AAROM, PROM, rotator cuff strengthening, stretching    Activity/Treatment Tolerance:  [x]  Patient tolerated treatment well  []  Patient limited by fatigue  []  Patient limited by pain   []  Patient limited by other medical complications  []  Other:     Assessment: Patient is progressing towards her  goals. Patient tolerating increased strengthening well, however weakness remains. Areas for Improvement: impaired ROM, impaired sensation, impaired strength, and pain  Prognosis: good    GOALS:  Patient Goal: increase strength, get rid of pain with sleeping    Short Term Goals:  Time Frame: 5 weeks  Patient will be independent with UE HEP for increased ease with dressing and doing her hair. GOAL MET. Patient reports doing her HEP 3-4 times a day. Patient reports increased ease with doing her hair. She states she still modifies dressing using her right arm to help with her left arm. REVISED GOAL:  Patient will be independent with UE HEP updates for increased ease with dressing and doing her hair. Patient will increase AROM left shoulder flexion to 152, extension to 50, abduction to 138, IR to 50 and ER to 65 degrees for increased ease with overhead reaching. GOAL PARTIALLY MET. AROM left shoulder flexion = 150, extension = 50, abduction = 145, IR = 55 and ER = 65 degrees. REVISED GOAL:  Patient will increase AROM left shoulder flexion to 155, extension to 55, abduction to 150, IR to 60 and ER to 70 degrees for increased ease with overhead reaching. Patient will increase PROM left shoulder flexion to 148, extension to 55, abduction to 150, IR to and ER to 70 degrees for increased ease with AROM and hooking her bra. GOAL PARTIALLY MET. PROM left shoulder flexion = 163, extension = 60, abduction = 150, IR = 75 and ER = 58 degrees. REVISED GOAL:  Patient will increase PROM left shoulder abduction to 155, ER to 70 degrees for increased ease with AROM and doing her hair. Patient will report pain in her eft shoulder no greater than 2/10 with reaching tasks. GOAL NOT MET. Patient reports pain is more with bringing her arm back down after reaching at 3/10. CONTINUE GOAL. Long Term Goals:  Time Frame: 8 weeks  1. Patient will improve UEFS to at least 76. GOAL NOT MET. UEFS = 69/80. CONTINUE GOAL. 2.  Patient will demonstrate ability to reach overhead for an object with affected extremity without increased pain. CONTINUE GOAL. Patient reports pain is more with bringing her arm down. 3.  Patient will report increased ease with sleep with no instances of left shoulder pain waking her at night. GOAL NOT MET. Patient reports left shoulder pain wakes her up at night yet. 3 times a night. CONTINUE GOAL. Patient Education:   [x]  HEP/Education Completed: Plan of Care, Goals, plan of care  [employer] Access Code for HEP: Access Code: [email or web address]  URL: [email or web address]  Date: 09/15/2022  Prepared by:  Antonio Ontiveros    Exercises  Seated Scapular Retraction - 2-3 x daily - 7 x weekly - 1 sets - 10 reps  Standing Shoulder Posterior Capsule Stretch - 2-3 x daily - 7 x weekly - 1 sets - 10 reps  Wall [place] - 2 x daily - 7 x weekly - 1 sets - 10 reps - 3 hold  Shoulder Flexion Wall Slide with Towel - 2 x daily - 7 x weekly - 1 sets - 10 reps - 3 hold  Standing Shoulder Abduction Slides at Wall - 2 x daily - 7 x weekly - 1 sets - 10 reps - 3 hold  Doorway Pec Stretch at 60 Elevation - 2-3 x daily - 7 x weekly - 1 sets - 5 reps - 10 hold  Sleeper Stretch - 2 x daily - 7 x weekly - 1 sets - 5 reps - 10-15 hold  9/22/22: Purpose of ultrasound  9/23/22: IR towel stretch, supine ER/IR stretch, supine pec stretch on vertical towel roll  9/29/22: discussed balancing muscles with strengthening posterior musculature  10/10/22:  Demonstrated/discussed chin tucks and wall angels to trial  10/13/22: Access Code: [email or web address]  URL: [email or web address]  Date: 10/13/2022  Prepared by: Krystle Sieving    Exercises  Prone Single Arm Shoulder Horizontal Abduction with Scapular Retraction and Palm Down - 1-2 x daily - 7 x weekly - 1 sets - 5-10 reps - 2 hold  Prone Shoulder Extension - Single Arm - 1-2 x daily - 7 x weekly - 1 sets - 5-10 reps - 2 hold  Prone Single Arm Shoulder Scaption Palm Down - 1 x daily - 7 x weekly - 1 sets - 5-10 reps - 2 hold  10/18/22: Access Code: [email or web address]  URL: [email or web address]  Date: 10/18/2022  Prepared by:  Sheria Sieving  Exercises  Scapular Retraction with Resistance - 2 x daily - 7 x weekly - 1 sets - 10 reps  Scapular Retraction with Resistance Advanced - 2 x daily - 7 x weekly - 1 sets - 10 reps  Standing Shoulder Internal Rotation with Anchored Resistance - 2 x daily - 7 x weekly - 1 sets - 10 reps  Shoulder External Rotation with Anchored Resistance - 2 x daily - 7 x weekly - 1 sets - 10 reps  Standing Single Arm Shoulder Flexion with Posterior Anchored Resistance - 2 x daily - 7 x weekly - 1 sets - 10 reps  10/28- sidelying ER and abduction   []  No new Education completed  [x]  Reviewed Prior HEP and modified to include sidelying ER with towel and abduction and standing orange theraband shoulder rows, ER/IR, flexion, extension     [x]  Patient verbalized and/or demonstrated understanding of education provided. []  Patient unable to verbalize and/or demonstrate understanding of education provided. Will continue education. [x]  Barriers to learning: none    PLAN:  Treatment Recommendations: Strengthening, Range of Motion, Manual Therapy - Soft Tissue Mobilization, Manual Therapy - Joint Manipulation, Home Exercise Program, and Modalities    []  Plan of care initiated. Plan to see patient 2 times per week for 8 weeks to address the treatment planned outlined above. [x]  Continue with current plan of care  []  Modify plan of care as follows:    []  Hold pending physician visit  []  Discharge    Time In 0815   Time Out 0904   Timed Code Minutes: 49 min   Total Treatment Time: 49 min       Electronically Signed by:  Rebecca Denver COTA/L #[id number]

## 2022-11-01 ENCOUNTER — HOSPITAL ENCOUNTER (OUTPATIENT)
Dept: OCCUPATIONAL THERAPY | Age: 57
Setting detail: THERAPIES SERIES
Discharge: HOME | End: 2022-11-01
Payer: COMMERCIAL

## 2022-11-01 PROCEDURE — 97035 APP MDLTY 1+ULTRASOUND EA 15: CPT

## 2022-11-01 PROCEDURE — 97110 THERAPEUTIC EXERCISES: CPT

## 2022-11-01 NOTE — PROGRESS NOTES
[address] THERAPY  [] EVALUATION  [x] DAILY NOTE (LAND) [] DAILY NOTE (AQUATIC ) [] PROGRESS NOTE [] DISCHARGE NOTE    [] [address] - [place]   [] [address]    [] [address] [facility]   [x] Shira Apo    Date: 2022  Patient Name:  Arueliano Mora  : 1965  MRN: [id number]  CSN: [id number]    Referring Practitioner Filemon Pena,    Diagnosis Adhesive capsulitis of left shoulder [M75.02]  Incomplete rotator cuff tear or rupture of left shoulder, not specified as traumatic [M75.112]    Treatment Diagnosis Adhesive capsulitis of left shoulder   Date of Evaluation 9/15/22      Functional Outcome Measure Used Upper Extremity Functional Scale   Functional Outcome Score 72/80 (9/15/22) 69/80 (10/13/22)      Insurance: Primary: Payor: Montrell Landry /  /  / ,   Secondary:    Authorization Information: 90 visits PT/OT/ST combined - none used, no precert required, aquatics covered, modalities covered   Visit # 13, 4/10 for progress note, PN completed 10/13/22   Visits Allowed: 30   Recertification Date: November 10, 2022   Physician Follow-Up: PRN   Physician Orders: Script for eval adhesive capsulitis of left shoulder, incomplete tear of left rotator cuff, unspecified whether traumatic   Pertinent History:7 Patient reports about 6 months ago she notices she was having some mobility and strength issues in her left shoulder and when she moved it would catch. No specific injury reported. Patient reports partial tear and a cyst on left shoulder per MRI. Past medical history includes high blood pressure. SUBJECTIVE: Patient reports she feels she is stretching better but is getting some \"hits\" of pain in posterior shoulder.        OBJECTIVE:  TREATMENT   Precautions:  none per patient    Pain: 0/10 at rest, 2/10 left posterior shoulder sharp pains that go away quickly     X in shaded column indicates Activity Completed Today   Modalities Parameters/  Location  Notes/Comments   Ultrasound to L shoulder posterior shoulder for pain control 100% continuous, 1.1 henley/cm2, 1 MHz x 8 minutes X                Manual Therapy Time/  Technique  Notes/Comments   Supine PROM to left shoulder all planes to tolerance, PROM for IR in sidelying  X    STM manually to left posterior shoulder and upper trap for tight/painful musculature  X    STM manually to anterior and lateral shoulder for pain control  X    GH mobs   X    Sidelying scap mobs  X    Exercises   Sets/  Sec Reps  Notes/Comments   Scapular retraction 3 sec 10 X No pain reported this date   Pulleys for shoulder flexion 1 15 X    Biodex UBE at 85 3 minutes backwards and 2 minutes forward  5 min     Supine vertical towel roll with manual overpressure from therapist        Supine ER with hands behind head for ER stretch 10  sec 5  Vertical towel roll under spine. Supine dowel yelitza shoulder flexion 90 to tolerance with joint distraction for improved tolerance, horizontal abduction/adduction 1  10 ea  Patient states these stretches feel good   Supine SA punch  2 sec 10 X Added 2# weight   Supine shoulder circles CW and CCW 1 10 ea     Supine dowel ER  5 sec 5  Initiated. Min cues with good technique. Painful end range but good stretch noted    IR sleeper stretch 15 sec 5  Cues and demonstration for correct technique   Wall slides for flexion and scaption 5 5     Modified corner pec stretch 10 3     Posterior capsule stretch 10 3 X Manual by therapist this date    Towel stretch IR  10 sec 3  Initiated. Good stretch noted, weakness    Orange theraband for rows, shoulder flexion, extension, IR and ER with towel roll under arm 1 10 X    Supine orange theraband for horizontal abduction, diaganols down and diaganols up 1 10 ea X    Prone horizontal abduction, scaption and extension with palm flat 2 sec 10  Cues for technique   Sidelying ER with towel at elbow 2 sec 10 X Tolerated well.   Mild difficulty   Sidelying Abduction with palm forward 1 10 X Distraction for comfort, some discomfort reported in bicep tendon area         Activities Time    Notes/Comments   Kinesiotape- Y strip upper trap inhibition, one I strip mechanical correction anterior to posterior shoulder and one I strip for bicep inhibition    Patient states she thinks Kinesiotape helped. Goal assessment completed              Specific Interventions Next Treatment: ultrasound as needed for pain control, Kinesiotaping, AROM, AAROM, PROM, rotator cuff strengthening, stretching    Activity/Treatment Tolerance:  [x]  Patient tolerated treatment well  []  Patient limited by fatigue  []  Patient limited by pain   []  Patient limited by other medical complications  []  Other:     Assessment: Patient is progressing towards her  goals. Tenderness in bicep tendon area with horizontal abduction theraband exercises. Areas for Improvement: impaired ROM, impaired sensation, impaired strength, and pain  Prognosis: good    GOALS:  Patient Goal: increase strength, get rid of pain with sleeping    Short Term Goals:  Time Frame: 5 weeks  Patient will be independent with UE HEP for increased ease with dressing and doing her hair. GOAL MET. Patient reports doing her HEP 3-4 times a day. Patient reports increased ease with doing her hair. She states she still modifies dressing using her right arm to help with her left arm. REVISED GOAL:  Patient will be independent with UE HEP updates for increased ease with dressing and doing her hair. Patient will increase AROM left shoulder flexion to 152, extension to 50, abduction to 138, IR to 50 and ER to 65 degrees for increased ease with overhead reaching. GOAL PARTIALLY MET. AROM left shoulder flexion = 150, extension = 50, abduction = 145, IR = 55 and ER = 65 degrees.   REVISED GOAL:  Patient will increase AROM left shoulder flexion to 155, extension to 55, abduction to 150, IR to 60 and ER to 70 degrees for increased ease with overhead reaching. Patient will increase PROM left shoulder flexion to 148, extension to 55, abduction to 150, IR to and ER to 70 degrees for increased ease with AROM and hooking her bra. GOAL PARTIALLY MET. PROM left shoulder flexion = 163, extension = 60, abduction = 150, IR = 75 and ER = 58 degrees. REVISED GOAL:  Patient will increase PROM left shoulder abduction to 155, ER to 70 degrees for increased ease with AROM and doing her hair. Patient will report pain in her eft shoulder no greater than 2/10 with reaching tasks. GOAL NOT MET. Patient reports pain is more with bringing her arm back down after reaching at 3/10. CONTINUE GOAL. Long Term Goals:  Time Frame: 8 weeks  1. Patient will improve UEFS to at least 76. GOAL NOT MET. UEFS = 69/80. CONTINUE GOAL. 2.  Patient will demonstrate ability to reach overhead for an object with affected extremity without increased pain. CONTINUE GOAL. Patient reports pain is more with bringing her arm down. 3.  Patient will report increased ease with sleep with no instances of left shoulder pain waking her at night. GOAL NOT MET. Patient reports left shoulder pain wakes her up at night yet. 3 times a night. CONTINUE GOAL. Patient Education:   []  HEP/Education Completed: Plan of Care, Goals, plan of care  [address] Access Code for HEP: Access Code: [email or web address]  URL: [email or web address]  Date: 09/15/2022  Prepared by:  Molina Cedeno    Exercises  Seated Scapular Retraction - 2-3 x daily - 7 x weekly - 1 sets - 10 reps  Standing Shoulder Posterior Capsule Stretch - 2-3 x daily - 7 x weekly - 1 sets - 10 reps  Wall [place] - 2 x daily - 7 x weekly - 1 sets - 10 reps - 3 hold  Shoulder Flexion Wall Slide with Towel - 2 x daily - 7 x weekly - 1 sets - 10 reps - 3 hold  Standing Shoulder Abduction Slides at Wall - 2 x daily - 7 x weekly - 1 sets - 10 reps - 3 hold  Doorway Pec Stretch at 60 Elevation - 2-3 x daily - 7 x weekly - 1 sets - 5 reps - 10 hold  Sleeper Stretch - 2 x daily - 7 x weekly - 1 sets - 5 reps - 10-15 hold  9/22/22: Purpose of ultrasound  9/23/22: IR towel stretch, supine ER/IR stretch, supine pec stretch on vertical towel roll  9/29/22: discussed balancing muscles with strengthening posterior musculature  10/10/22:  Demonstrated/discussed chin tucks and wall angels to trial  10/13/22: Access Code: [email or web address]  URL: [email or web address]  Date: 10/13/2022  Prepared by: Julienne Kisha    Exercises  Prone Single Arm Shoulder Horizontal Abduction with Scapular Retraction and Palm Down - 1-2 x daily - 7 x weekly - 1 sets - 5-10 reps - 2 hold  Prone Shoulder Extension - Single Arm - 1-2 x daily - 7 x weekly - 1 sets - 5-10 reps - 2 hold  Prone Single Arm Shoulder Scaption Palm Down - 1 x daily - 7 x weekly - 1 sets - 5-10 reps - 2 hold  10/18/22: Access Code: [email or web address]  URL: [email or web address]  Date: 10/18/2022  Prepared by: Julienne Kisha  Exercises  Scapular Retraction with Resistance - 2 x daily - 7 x weekly - 1 sets - 10 reps  Scapular Retraction with Resistance Advanced - 2 x daily - 7 x weekly - 1 sets - 10 reps  Standing Shoulder Internal Rotation with Anchored Resistance - 2 x daily - 7 x weekly - 1 sets - 10 reps  Shoulder External Rotation with Anchored Resistance - 2 x daily - 7 x weekly - 1 sets - 10 reps  Standing Single Arm Shoulder Flexion with Posterior Anchored Resistance - 2 x daily - 7 x weekly - 1 sets - 10 reps  10/28- sidelying ER and abduction   []  No new Education completed  [x]  Reviewed Prior HEP and modified to include sidelying ER with towel and abduction and standing orange theraband shoulder rows, ER/IR, flexion, extension     [x]  Patient verbalized and/or demonstrated understanding of education provided. []  Patient unable to verbalize and/or demonstrate understanding of education provided. Will continue education.   [x]  Barriers to learning: none    PLAN:  Treatment Recommendations: Strengthening, Range of Motion, Manual Therapy - Soft Tissue Mobilization, Manual Therapy - Joint Manipulation, Home Exercise Program, and Modalities    []  Plan of care initiated. Plan to see patient 2 times per week for 8 weeks to address the treatment planned outlined above. [x]  Continue with current plan of care  []  Modify plan of care as follows:    []  Hold pending physician visit  []  Discharge    Time In 0759   Time Out 0847   Timed Code Minutes: 48 min   Total Treatment Time: 48 min       Electronically Signed by:  Sushant Putnam OTR/L #[id number]

## 2022-11-03 ENCOUNTER — HOSPITAL ENCOUNTER (OUTPATIENT)
Dept: OCCUPATIONAL THERAPY | Age: 57
Setting detail: THERAPIES SERIES
Discharge: HOME | End: 2022-11-03
Payer: COMMERCIAL

## 2022-11-03 PROCEDURE — 97110 THERAPEUTIC EXERCISES: CPT

## 2022-11-03 PROCEDURE — 97035 APP MDLTY 1+ULTRASOUND EA 15: CPT

## 2022-11-03 NOTE — PROGRESS NOTES
[address] THERAPY  [] EVALUATION  [x] DAILY NOTE (LAND) [] DAILY NOTE (AQUATIC ) [] PROGRESS NOTE [] DISCHARGE NOTE    [] [address] - [place]   [] [address]    [] [address] [facility]   [x] Triston Feliciano    Date: 11/3/2022  Patient Name:  Viviane Hooper  : 1965  MRN: [id number]  CSN: [id number]    Referring Practitioner Mayra Lay DO   Diagnosis Adhesive capsulitis of left shoulder [M75.02]  Incomplete rotator cuff tear or rupture of left shoulder, not specified as traumatic [M75.112]    Treatment Diagnosis Adhesive capsulitis of left shoulder   Date of Evaluation 9/15/22      Functional Outcome Measure Used Upper Extremity Functional Scale   Functional Outcome Score 72/80 (9/15/22) 69/80 (10/13/22)      Insurance: Primary: Payor: Miguel Garcia /  /  / ,   Secondary:    Authorization Information: 90 visits PT/OT/ST combined - none used, no precert required, aquatics covered, modalities covered   Visit # 14, 5/10 for progress note, PN completed 10/13/22   Visits Allowed: 30   Recertification Date: November 10, 2022   Physician Follow-Up: PRN   Physician Orders: Script for eval adhesive capsulitis of left shoulder, incomplete tear of left rotator cuff, unspecified whether traumatic   Pertinent History:7 Patient reports about 6 months ago she notices she was having some mobility and strength issues in her left shoulder and when she moved it would catch. No specific injury reported. Patient reports partial tear and a cyst on left shoulder per MRI. Past medical history includes high blood pressure. SUBJECTIVE: Patient reports it is more discomfort than pain today. Patient reports she is doing more and using muscles she has not worked as much.         OBJECTIVE:  TREATMENT   Precautions:  none per patient    Pain: 1/10 at rest left posterior shoulder     X in shaded column indicates Activity Completed Today Modalities Parameters/  Location  Notes/Comments   Ultrasound to L shoulder posterior shoulder for pain control 100% continuous, 1.1 henley/cm2, 1 MHz x 8 minutes  Declined ultrasound today. Manual Therapy Time/  Technique  Notes/Comments   Supine PROM to left shoulder all planes to tolerance  X    STM manually to left posterior shoulder and upper trap for tight/painful musculature  X Left posterior shoulder primarily today   STM manually to anterior and lateral shoulder for pain control  X    GH mobs   X    Sidelying scap mobs  X    Exercises   Sets/  Sec Reps  Notes/Comments   Scapular retraction 3 sec 10 X No pain reported this date   Pulleys for shoulder flexion 1 15 X    Biodex UBE at 85 3 minutes backwards and 2 minutes forward  5 min X    Sidelying IR sleeper stretch 15 sec 5 X    Supine vertical towel roll with manual overpressure from therapist        Supine ER with hands behind head for ER stretch 10  sec 5  Vertical towel roll under spine. Supine dowel yelitza shoulder flexion 90 to tolerance with joint distraction for improved tolerance, horizontal abduction/adduction 1  10 ea  Patient states these stretches feel good   Supine SA punch with 2# weight 2 sec 10 X    Supine shoulder circles CW and CCW 1 10 ea X Added 1# weight   Wall slides for flexion and scaption 5 5     Modified corner pec stretch 10 3     Posterior capsule stretch 10 5 X    Towel stretch IR  10 sec 3  Initiated. Good stretch noted, weakness    Orange theraband for rows, shoulder flexion, extension, IR and ER with towel roll under arm 1 10 X    Supine orange theraband for horizontal abduction, diaganols down and diaganols up 1 10 ea X    Prone horizontal abduction, scaption and extension with palm flat 2 sec 10 X Cues for technique   Sidelying ER with towel at elbow 2 sec 10 X Tolerated well.   Mild difficulty   Sidelying Abduction with palm forward 1 15 X Distraction for comfort, some pulling reported         Activities Time    Notes/Comments   Kinesiotape- Y strip upper trap inhibition, one I strip mechanical correction anterior to posterior shoulder and one I strip for bicep inhibition    Patient states she thinks Kinesiotape helped. Goal assessment completed              Specific Interventions Next Treatment: ultrasound as needed for pain control, Kinesiotaping, AROM, AAROM, PROM, rotator cuff strengthening, stretching    Activity/Treatment Tolerance:  [x]  Patient tolerated treatment well  []  Patient limited by fatigue  []  Patient limited by pain   []  Patient limited by other medical complications  []  Other:     Assessment: Patient is progressing towards her  goals. Tolerating increased strengthening/reps well. Areas for Improvement: impaired ROM, impaired sensation, impaired strength, and pain  Prognosis: good    GOALS:  Patient Goal: increase strength, get rid of pain with sleeping    Short Term Goals:  Time Frame: 5 weeks  Patient will be independent with UE HEP for increased ease with dressing and doing her hair. GOAL MET. Patient reports doing her HEP 3-4 times a day. Patient reports increased ease with doing her hair. She states she still modifies dressing using her right arm to help with her left arm. REVISED GOAL:  Patient will be independent with UE HEP updates for increased ease with dressing and doing her hair. Patient will increase AROM left shoulder flexion to 152, extension to 50, abduction to 138, IR to 50 and ER to 65 degrees for increased ease with overhead reaching. GOAL PARTIALLY MET. AROM left shoulder flexion = 150, extension = 50, abduction = 145, IR = 55 and ER = 65 degrees. REVISED GOAL:  Patient will increase AROM left shoulder flexion to 155, extension to 55, abduction to 150, IR to 60 and ER to 70 degrees for increased ease with overhead reaching.     Patient will increase PROM left shoulder flexion to 148, extension to 55, abduction to 150, IR to and ER to 70 degrees for increased ease with AROM and hooking her bra. GOAL PARTIALLY MET. PROM left shoulder flexion = 163, extension = 60, abduction = 150, IR = 75 and ER = 58 degrees. REVISED GOAL:  Patient will increase PROM left shoulder abduction to 155, ER to 70 degrees for increased ease with AROM and doing her hair. Patient will report pain in her eft shoulder no greater than 2/10 with reaching tasks. GOAL NOT MET. Patient reports pain is more with bringing her arm back down after reaching at 3/10. CONTINUE GOAL. Long Term Goals:  Time Frame: 8 weeks  1. Patient will improve UEFS to at least 76. GOAL NOT MET. UEFS = 69/80. CONTINUE GOAL. 2.  Patient will demonstrate ability to reach overhead for an object with affected extremity without increased pain. CONTINUE GOAL. Patient reports pain is more with bringing her arm down. 3.  Patient will report increased ease with sleep with no instances of left shoulder pain waking her at night. GOAL NOT MET. Patient reports left shoulder pain wakes her up at night yet. 3 times a night. CONTINUE GOAL. Patient Education:   [x]  HEP/Education Completed: Plan of Care, Goals, plan of care  [address] [place] [address] Access Code for HEP: Access Code: [email or web address]  URL: [email or web address]  Date: 09/15/2022  Prepared by:  Soraya Mancini    Exercises  Seated Scapular Retraction - 2-3 x daily - 7 x weekly - 1 sets - 10 reps  Standing Shoulder Posterior Capsule Stretch - 2-3 x daily - 7 x weekly - 1 sets - 10 reps  Wall [place] - 2 x daily - 7 x weekly - 1 sets - 10 reps - 3 hold  Shoulder Flexion Wall Slide with Towel - 2 x daily - 7 x weekly - 1 sets - 10 reps - 3 hold  Standing Shoulder Abduction Slides at Wall - 2 x daily - 7 x weekly - 1 sets - 10 reps - 3 hold  Doorway Pec Stretch at 60 Elevation - 2-3 x daily - 7 x weekly - 1 sets - 5 reps - 10 hold  Sleeper Stretch - 2 x daily - 7 x weekly - 1 sets - 5 reps - 10-15 hold  9/22/22: Purpose of ultrasound  9/23/22: IR towel stretch, supine ER/IR stretch, supine pec stretch on vertical towel roll  9/29/22: discussed balancing muscles with strengthening posterior musculature  10/10/22:  Demonstrated/discussed chin tucks and wall angels to trial  10/13/22: Access Code: [email or web address]  URL: [email or web address]/  Date: 10/13/2022  Prepared by: Severa Due    Exercises  Prone Single Arm Shoulder Horizontal Abduction with Scapular Retraction and Palm Down - 1-2 x daily - 7 x weekly - 1 sets - 5-10 reps - 2 hold  Prone Shoulder Extension - Single Arm - 1-2 x daily - 7 x weekly - 1 sets - 5-10 reps - 2 hold  Prone Single Arm Shoulder Scaption Palm Down - 1 x daily - 7 x weekly - 1 sets - 5-10 reps - 2 hold  10/18/22: Access Code: [email or web address]  URL: [email or web address]  Date: 10/18/2022  Prepared by: Severa Due  Exercises  Scapular Retraction with Resistance - 2 x daily - 7 x weekly - 1 sets - 10 reps  Scapular Retraction with Resistance Advanced - 2 x daily - 7 x weekly - 1 sets - 10 reps  Standing Shoulder Internal Rotation with Anchored Resistance - 2 x daily - 7 x weekly - 1 sets - 10 reps  Shoulder External Rotation with Anchored Resistance - 2 x daily - 7 x weekly - 1 sets - 10 reps  Standing Single Arm Shoulder Flexion with Posterior Anchored Resistance - 2 x daily - 7 x weekly - 1 sets - 10 reps  10/28- sidelying ER and abduction   11/3/22: Access Code: [email or web address]  URL: [email or web address]  Date: 11/03/2022  Prepared by:  Severa Due    Exercises  Prone Single Arm Shoulder Horizontal Abduction with Scapular Retraction and Palm Down - 1-2 x daily - 7 x weekly - 1 sets - 10 reps - 2 hold  Prone Shoulder Extension - Single Arm - 1-2 x daily - 7 x weekly - 1 sets - 10 reps - 2 hold  Prone Single Arm Shoulder Scaption Palm Down - 1-2 x daily - 7 x weekly - 1 sets - 10 reps - 2 hold    []  No new Education completed  [x]  Reviewed Prior HEP and modified to include sidelying ER with towel and abduction and standing orange theraband shoulder rows, ER/IR, flexion, extension     [x]  Patient verbalized and/or demonstrated understanding of education provided. []  Patient unable to verbalize and/or demonstrate understanding of education provided. Will continue education. [x]  Barriers to learning: none    PLAN:  Treatment Recommendations: Strengthening, Range of Motion, Manual Therapy - Soft Tissue Mobilization, Manual Therapy - Joint Manipulation, Home Exercise Program, and Modalities    []  Plan of care initiated. Plan to see patient 2 times per week for 8 weeks to address the treatment planned outlined above. [x]  Continue with current plan of care  []  Modify plan of care as follows:    []  Hold pending physician visit  []  Discharge    Time In 0728   Time Out 0815   Timed Code Minutes: 47 min   Total Treatment Time: 47 min       Electronically Signed by:  Krystle Leonard OTR/L #[id number]

## 2022-11-07 ENCOUNTER — HOSPITAL ENCOUNTER (OUTPATIENT)
Dept: OCCUPATIONAL THERAPY | Age: 57
Setting detail: THERAPIES SERIES
Discharge: HOME | End: 2022-11-07
Payer: COMMERCIAL

## 2022-11-07 PROCEDURE — 97110 THERAPEUTIC EXERCISES: CPT

## 2022-11-07 NOTE — PROGRESS NOTES
[address] THERAPY  [] EVALUATION  [x] DAILY NOTE (LAND) [] DAILY NOTE (AQUATIC ) [] PROGRESS NOTE [] DISCHARGE NOTE    [] [address] - [place]   [] [address]    [] [address] [facility]   [x] [facility]    Date: 2022  Patient Name:  Amrita Ross  : 1965  MRN: [id number]  CSN: [id number]    Referring Practitioner Brenda Alcaraz DO   Diagnosis Adhesive capsulitis of left shoulder [M75.02]  Incomplete rotator cuff tear or rupture of left shoulder, not specified as traumatic [M75.112]    Treatment Diagnosis Adhesive capsulitis of left shoulder   Date of Evaluation 9/15/22      Functional Outcome Measure Used Upper Extremity Functional Scale   Functional Outcome Score 72/80 (9/15/22) 69/80 (10/13/22)      Insurance: Primary: Payor: Tobi Cortez /  /  / ,   Secondary:    Authorization Information: 90 visits PT/OT/ST combined - none used, no precert required, aquatics covered, modalities covered   Visit # 15, 6/10 for progress note, PN completed 10/13/22   Visits Allowed:    Recertification Date: November 10, 2022   Physician Follow-Up: PRN   Physician Orders: Script for eval adhesive capsulitis of left shoulder, incomplete tear of left rotator cuff, unspecified whether traumatic   Pertinent History:7 Patient reports about 6 months ago she notices she was having some mobility and strength issues in her left shoulder and when she moved it would catch. No specific injury reported. Patient reports partial tear and a cyst on left shoulder per MRI. Past medical history includes high blood pressure. SUBJECTIVE: Patient reports she is feeling really good. Patient reports her shoulder has really improved since starting therapy. OBJECTIVE:  TREATMENT   Precautions:  none per patient    Pain: denies pain today.        X in shaded column indicates Activity Completed Today   Modalities Parameters/  Location Notes/Comments   Ultrasound to L shoulder posterior shoulder for pain control 100% continuous, 1.1 henley/cm2, 1 MHz x 8 minutes  Declined ultrasound today. Manual Therapy Time/  Technique  Notes/Comments   Supine PROM to left shoulder all planes to tolerance  X Tightest in ER   STM manually to left posterior shoulder and upper trap for tight/painful musculature   Left posterior shoulder primarily today   STM manually to anterior and lateral shoulder for pain control      GH mobs   X    Sidelying scap mobs      Exercises   Sets/  Sec Reps  Notes/Comments   Scapular retraction 3 sec 10  No pain reported this date   Pulleys for shoulder flexion 1 15 X    Biodex UBE at 80 3 minutes backwards and 3 minutes forward  6 min X    Sidelying IR sleeper stretch 15 sec 5     Supine vertical towel roll with manual overpressure from therapist        Supine ER with hands behind head for ER stretch 10  sec 5  Vertical towel roll under spine. Supine SA punch with 2# weight 2 sec 15 X Increased reps   Supine shoulder circles CW and CCW 1 15 ea X Added 1# weight   Wall slides for flexion and scaption 5 5     Modified corner pec stretch 10 3     Posterior capsule stretch 10 5 X    Towel stretch IR  10 sec 3  Initiated. Good stretch noted, weakness    Orange theraband for rows, shoulder flexion, extension, IR and ER with towel roll under arm 1 10 X    Supine orange theraband for horizontal abduction, diaganols down and diaganols up 1 15 ea X Increased reps   Supine alphabet for scapular strengthening  all X    Prone horizontal abduction, scaption and extension with palm flat 2 sec 10 X Cues for technique, tightness in scaption noted   Corner pec stretch  15 sec 5 X    Sidelying ER with towel at elbow 2 sec 15 X Tolerated well.   \"Good stretch\"   Sidelying Abduction with palm forward 1 15 X Added 1# weight         Activities Time    Notes/Comments   Kinesiotape- Y strip upper trap inhibition, one I strip mechanical correction anterior to posterior shoulder and one I strip for bicep inhibition    Patient states she thinks Kinesiotape helped. Goal assessment completed              Specific Interventions Next Treatment: ultrasound as needed for pain control, Kinesiotaping, AROM, AAROM, PROM, rotator cuff strengthening, stretching    Activity/Treatment Tolerance:  [x]  Patient tolerated treatment well  []  Patient limited by fatigue  []  Patient limited by pain   []  Patient limited by other medical complications  []  Other:     Assessment: Patient is progressing towards her  goals. Tolerating increased strengthening/reps well. Areas for Improvement: impaired ROM, impaired sensation, impaired strength, and pain  Prognosis: good    GOALS:  Patient Goal: increase strength, get rid of pain with sleeping    Short Term Goals:  Time Frame: 5 weeks  Patient will be independent with UE HEP for increased ease with dressing and doing her hair. GOAL MET. Patient reports doing her HEP 3-4 times a day. Patient reports increased ease with doing her hair. She states she still modifies dressing using her right arm to help with her left arm. REVISED GOAL:  Patient will be independent with UE HEP updates for increased ease with dressing and doing her hair. Patient will increase AROM left shoulder flexion to 152, extension to 50, abduction to 138, IR to 50 and ER to 65 degrees for increased ease with overhead reaching. GOAL PARTIALLY MET. AROM left shoulder flexion = 150, extension = 50, abduction = 145, IR = 55 and ER = 65 degrees. REVISED GOAL:  Patient will increase AROM left shoulder flexion to 155, extension to 55, abduction to 150, IR to 60 and ER to 70 degrees for increased ease with overhead reaching. Patient will increase PROM left shoulder flexion to 148, extension to 55, abduction to 150, IR to and ER to 70 degrees for increased ease with AROM and hooking her bra. GOAL PARTIALLY MET.   PROM left shoulder flexion balancing muscles with strengthening posterior musculature  10/10/22:  Demonstrated/discussed chin tucks and wall angels to trial  10/13/22: Access Code: [email or web address]  URL: [employer]/  Date: 10/13/2022  Prepared by: Trixie Serve    Exercises  Prone Single Arm Shoulder Horizontal Abduction with Scapular Retraction and Palm Down - 1-2 x daily - 7 x weekly - 1 sets - 5-10 reps - 2 hold  Prone Shoulder Extension - Single Arm - 1-2 x daily - 7 x weekly - 1 sets - 5-10 reps - 2 hold  Prone Single Arm Shoulder Scaption Palm Down - 1 x daily - 7 x weekly - 1 sets - 5-10 reps - 2 hold  10/18/22: Access Code: [email or web address]  URL: [email or web address]  Date: 10/18/2022  Prepared by: Trixie Serve  Exercises  Scapular Retraction with Resistance - 2 x daily - 7 x weekly - 1 sets - 10 reps  Scapular Retraction with Resistance Advanced - 2 x daily - 7 x weekly - 1 sets - 10 reps  Standing Shoulder Internal Rotation with Anchored Resistance - 2 x daily - 7 x weekly - 1 sets - 10 reps  Shoulder External Rotation with Anchored Resistance - 2 x daily - 7 x weekly - 1 sets - 10 reps  Standing Single Arm Shoulder Flexion with Posterior Anchored Resistance - 2 x daily - 7 x weekly - 1 sets - 10 reps  10/28- sidelying ER and abduction   11/3/22: Access Code: [email or web address]  URL: [email or web address]  Date: 11/03/2022  Prepared by:  Trixie Serve    Exercises  Prone Single Arm Shoulder Horizontal Abduction with Scapular Retraction and Palm Down - 1-2 x daily - 7 x weekly - 1 sets - 10 reps - 2 hold  Prone Shoulder Extension - Single Arm - 1-2 x daily - 7 x weekly - 1 sets - 10 reps - 2 hold  Prone Single Arm Shoulder Scaption Palm Down - 1-2 x daily - 7 x weekly - 1 sets - 10 reps - 2 hold    []  No new Education completed  [x]  Reviewed Prior HEP and modified to include sidelying ER with towel and abduction and standing orange theraband shoulder rows, ER/IR, flexion, extension     [x]  Patient verbalized and/or demonstrated understanding of education provided. []  Patient unable to verbalize and/or demonstrate understanding of education provided. Will continue education. [x]  Barriers to learning: none    PLAN:  Treatment Recommendations: Strengthening, Range of Motion, Manual Therapy - Soft Tissue Mobilization, Manual Therapy - Joint Manipulation, Home Exercise Program, and Modalities    []  Plan of care initiated. Plan to see patient 2 times per week for 8 weeks to address the treatment planned outlined above. [x]  Continue with current plan of care  []  Modify plan of care as follows:    []  Hold pending physician visit  []  Discharge    Time In 0847   Time Out 0928   Timed Code Minutes: 41 min   Total Treatment Time: 41 min       Electronically Signed by:  Jazmin Shell OTR/L #[id number]

## 2022-11-08 ENCOUNTER — HOSPITAL ENCOUNTER (OUTPATIENT)
Dept: MAMMOGRAPHY | Age: 57
Discharge: HOME | End: 2022-11-08
Payer: COMMERCIAL

## 2022-11-08 ENCOUNTER — HOSPITAL ENCOUNTER (OUTPATIENT)
Dept: WOMENS IMAGING | Age: 57
Discharge: HOME | End: 2022-11-08

## 2022-11-08 DIAGNOSIS — Z12.31 VISIT FOR SCREENING MAMMOGRAM: ICD-10-CM

## 2022-11-08 DIAGNOSIS — Z00.6 ENCOUNTER FOR EXAMINATION FOR NORMAL COMPARISON OR CONTROL IN CLINICAL RESEARCH PROGRAM: ICD-10-CM

## 2022-11-08 PROCEDURE — 77063 BREAST TOMOSYNTHESIS BI: CPT

## 2022-11-10 ENCOUNTER — APPOINTMENT (OUTPATIENT)
Dept: OCCUPATIONAL THERAPY | Age: 57
End: 2022-11-10
Payer: COMMERCIAL

## 2022-11-11 ENCOUNTER — APPOINTMENT (OUTPATIENT)
Dept: OCCUPATIONAL THERAPY | Age: 57
End: 2022-11-11
Payer: COMMERCIAL

## 2022-11-15 ENCOUNTER — HOSPITAL ENCOUNTER (OUTPATIENT)
Dept: OCCUPATIONAL THERAPY | Age: 57
Setting detail: THERAPIES SERIES
Discharge: HOME | End: 2022-11-15
Payer: COMMERCIAL

## 2022-11-15 PROCEDURE — 97110 THERAPEUTIC EXERCISES: CPT

## 2022-11-15 NOTE — PROGRESS NOTES
[address] THERAPY  [] EVALUATION  [x] DAILY NOTE (LAND) [] DAILY NOTE (AQUATIC ) [] PROGRESS NOTE [] DISCHARGE NOTE    [] [address] - [place]   [] [address]    [] [address] [facility]   [x] [facility]    Date: 11/15/2022  Patient Name:  Mara Scott  : 1965  MRN: [id number]  CSN: [id number]    Referring Practitioner Edward Winn DO   Diagnosis Adhesive capsulitis of left shoulder [M75.02]  Incomplete rotator cuff tear or rupture of left shoulder, not specified as traumatic [M75.112]    Treatment Diagnosis Adhesive capsulitis of left shoulder   Date of Evaluation 9/15/22      Functional Outcome Measure Used Upper Extremity Functional Scale   Functional Outcome Score 72/80 (9/15/22) 69/80 (10/13/22)      Insurance: Primary: Payor: Jitendra Hart /  /  / ,   Secondary:    Authorization Information: 90 visits PT/OT/ST combined - none used, no precert required, aquatics covered, modalities covered   Visit # 16, 7/10 for progress note, PN completed 10/13/22   Visits Allowed: 30   Recertification Date: November 10, 2022   Physician Follow-Up: PRN   Physician Orders: Script for eval adhesive capsulitis of left shoulder, incomplete tear of left rotator cuff, unspecified whether traumatic   Pertinent History:7 Patient reports about 6 months ago she notices she was having some mobility and strength issues in her left shoulder and when she moved it would catch. No specific injury reported. Patient reports partial tear and a cyst on left shoulder per MRI. Past medical history includes high blood pressure. SUBJECTIVE: Patient reports some soreness. Patient reports she had a bad flair up with her back and had to go to the ER due to it. OBJECTIVE:  TREATMENT   Precautions:  none per patient    Pain: denies pain today.        X in shaded column indicates Activity Completed Today   Modalities Parameters/  Location Notes/Comments   Ultrasound to L shoulder posterior shoulder for pain control 100% continuous, 1.1 henley/cm2, 1 MHz x 8 minutes  Declined ultrasound today. Manual Therapy Time/  Technique  Notes/Comments   Supine PROM to left shoulder all planes to tolerance  X Tightest in ER   STM manually to left posterior shoulder and upper trap for tight/painful musculature   Left posterior shoulder primarily today   STM manually to anterior and lateral shoulder for pain control      GH mobs   X    Sidelying scap mobs      Exercises   Sets/  Sec Reps  Notes/Comments   Scapular retraction 3 sec 10  No pain reported this date   Pulleys for shoulder flexion 1 15 X    Biodex UBE at 80 3 minutes backwards and 3 minutes forward  6 min X Warm up   Sidelying IR sleeper stretch 15 sec 5     Supine vertical towel roll with manual overpressure from therapist        Supine ER with hands behind head for ER stretch 10  sec 5  Vertical towel roll under spine. Supine SA punch with 3# weight 2 sec 15 X Increased weight   Supine shoulder circles CW and CCW 1 15 ea X Increased weight 2#   Wall slides for flexion and scaption 5 5     Modified corner pec stretch 10 3     Posterior capsule stretch 10 5     Towel stretch IR  10 sec 3  Initiated.  Good stretch noted, weakness    Orange theraband for rows, shoulder flexion, extension, IR and ER with towel roll under arm 1 15 X 10 reps for ER   Sitting orange theraband for horizontal abduction, diaganols down and diaganols up 1 15 ea X 12 reps for diagonals up   Supine alphabet for scapular strengthening  all X Added 1#   Prone horizontal abduction, scaption and extension with palm flat 2 sec 10 X Cues for technique, tightness in scaption noted   Corner pec stretch  15 sec 5 X    Sidelying ER with towel at elbow 2 sec 10 X Added 1# weight   Sidelying Abduction with palm forward 1 15 X 1# weight         Activities Time    Notes/Comments   Kinesiotape- Y strip upper trap inhibition, one I strip mechanical correction anterior to posterior shoulder and one I strip for bicep inhibition    Patient states she thinks Kinesiotape helped. Goal assessment completed              Specific Interventions Next Treatment: ultrasound as needed for pain control, Kinesiotaping, AROM, AAROM, PROM, rotator cuff strengthening, stretching    Activity/Treatment Tolerance:  [x]  Patient tolerated treatment well  []  Patient limited by fatigue  []  Patient limited by pain   []  Patient limited by other medical complications  []  Other:     Assessment: Patient is progressing towards her  goals. Tolerating increased strengthening/reps well. Areas for Improvement: impaired ROM, impaired sensation, impaired strength, and pain  Prognosis: good    GOALS:  Patient Goal: increase strength, get rid of pain with sleeping    Short Term Goals:  Time Frame: 5 weeks  Patient will be independent with UE HEP for increased ease with dressing and doing her hair. GOAL MET. Patient reports doing her HEP 3-4 times a day. Patient reports increased ease with doing her hair. She states she still modifies dressing using her right arm to help with her left arm. REVISED GOAL:  Patient will be independent with UE HEP updates for increased ease with dressing and doing her hair. Patient will increase AROM left shoulder flexion to 152, extension to 50, abduction to 138, IR to 50 and ER to 65 degrees for increased ease with overhead reaching. GOAL PARTIALLY MET. AROM left shoulder flexion = 150, extension = 50, abduction = 145, IR = 55 and ER = 65 degrees. REVISED GOAL:  Patient will increase AROM left shoulder flexion to 155, extension to 55, abduction to 150, IR to 60 and ER to 70 degrees for increased ease with overhead reaching. Patient will increase PROM left shoulder flexion to 148, extension to 55, abduction to 150, IR to and ER to 70 degrees for increased ease with AROM and hooking her bra. GOAL PARTIALLY MET.   PROM left shoulder flexion = 163, extension = 60, abduction = 150, IR = 75 and ER = 58 degrees. REVISED GOAL:  Patient will increase PROM left shoulder abduction to 155, ER to 70 degrees for increased ease with AROM and doing her hair. Patient will report pain in her left shoulder no greater than 2/10 with reaching tasks. GOAL NOT MET. Patient reports pain is more with bringing her arm back down after reaching at 3/10. CONTINUE GOAL. Long Term Goals:  Time Frame: 8 weeks  1. Patient will improve UEFS to at least 76. GOAL NOT MET. UEFS = 69/80. CONTINUE GOAL. 2.  Patient will demonstrate ability to reach overhead for an object with affected extremity without increased pain. CONTINUE GOAL. Patient reports pain is more with bringing her arm down. 3.  Patient will report increased ease with sleep with no instances of left shoulder pain waking her at night. GOAL NOT MET. Patient reports left shoulder pain wakes her up at night yet. 3 times a night. CONTINUE GOAL. Patient Education:   []  HEP/Education Completed: Plan of Care, Goals, plan of care  Wendy Gracia Access Code for HEP: Access Code: [email or web address]  URL: [email or web address]  Date: 09/15/2022  Prepared by:  Zo Pillai    Exercises  Seated Scapular Retraction - 2-3 x daily - 7 x weekly - 1 sets - 10 reps  Standing Shoulder Posterior Capsule Stretch - 2-3 x daily - 7 x weekly - 1 sets - 10 reps  Wall [place] - 2 x daily - 7 x weekly - 1 sets - 10 reps - 3 hold  Shoulder Flexion Wall Slide with Towel - 2 x daily - 7 x weekly - 1 sets - 10 reps - 3 hold  Standing Shoulder Abduction Slides at Wall - 2 x daily - 7 x weekly - 1 sets - 10 reps - 3 hold  Doorway Pec Stretch at 60 Elevation - 2-3 x daily - 7 x weekly - 1 sets - 5 reps - 10 hold  Sleeper Stretch - 2 x daily - 7 x weekly - 1 sets - 5 reps - 10-15 hold  9/22/22: Purpose of ultrasound  9/23/22: IR towel stretch, supine ER/IR stretch, supine pec stretch on vertical towel roll  9/29/22: discussed balancing muscles with strengthening posterior musculature  10/10/22:  Demonstrated/discussed chin tucks and wall angels to trial  10/13/22: Access Code: [email or web address]  URL: [email or web address]  Date: 10/13/2022  Prepared by: Ronak Omalley    Exercises  Prone Single Arm Shoulder Horizontal Abduction with Scapular Retraction and Palm Down - 1-2 x daily - 7 x weekly - 1 sets - 5-10 reps - 2 hold  Prone Shoulder Extension - Single Arm - 1-2 x daily - 7 x weekly - 1 sets - 5-10 reps - 2 hold  Prone Single Arm Shoulder Scaption Palm Down - 1 x daily - 7 x weekly - 1 sets - 5-10 reps - 2 hold  10/18/22: Access Code: [email or web address]  URL: [email or web address]  Date: 10/18/2022  Prepared by: Ronak Omalley  Exercises  Scapular Retraction with Resistance - 2 x daily - 7 x weekly - 1 sets - 10 reps  Scapular Retraction with Resistance Advanced - 2 x daily - 7 x weekly - 1 sets - 10 reps  Standing Shoulder Internal Rotation with Anchored Resistance - 2 x daily - 7 x weekly - 1 sets - 10 reps  Shoulder External Rotation with Anchored Resistance - 2 x daily - 7 x weekly - 1 sets - 10 reps  Standing Single Arm Shoulder Flexion with Posterior Anchored Resistance - 2 x daily - 7 x weekly - 1 sets - 10 reps  10/28- sidelying ER and abduction   11/3/22: Access Code: [email or web address]  URL: [email or web address]  Date: 11/03/2022  Prepared by:  Ronak Omalley    Exercises  Prone Single Arm Shoulder Horizontal Abduction with Scapular Retraction and Palm Down - 1-2 x daily - 7 x weekly - 1 sets - 10 reps - 2 hold  Prone Shoulder Extension - Single Arm - 1-2 x daily - 7 x weekly - 1 sets - 10 reps - 2 hold  Prone Single Arm Shoulder Scaption Palm Down - 1-2 x daily - 7 x weekly - 1 sets - 10 reps - 2 hold    []  No new Education completed  [x]  Reviewed Prior HEP and modified to include sidelying ER with towel and abduction and standing orange theraband shoulder rows, ER/IR, flexion, extension     [x]  Patient verbalized and/or demonstrated understanding of education provided. []  Patient unable to verbalize and/or demonstrate understanding of education provided. Will continue education. [x]  Barriers to learning: none    PLAN:  Treatment Recommendations: Strengthening, Range of Motion, Manual Therapy - Soft Tissue Mobilization, Manual Therapy - Joint Manipulation, Home Exercise Program, and Modalities    []  Plan of care initiated. Plan to see patient 2 times per week for 8 weeks to address the treatment planned outlined above. [x]  Continue with current plan of care  []  Modify plan of care as follows:    []  Hold pending physician visit  []  Discharge    Time In 0800   Time Out 0849   Timed Code Minutes: 49 min   Total Treatment Time: 49 min       Electronically Signed by:  Drea Pickett OTR/L #[id number]

## 2022-11-17 ENCOUNTER — HOSPITAL ENCOUNTER (OUTPATIENT)
Dept: OCCUPATIONAL THERAPY | Age: 57
Setting detail: THERAPIES SERIES
Discharge: HOME | End: 2022-11-17
Payer: COMMERCIAL

## 2022-11-17 PROCEDURE — 97110 THERAPEUTIC EXERCISES: CPT

## 2022-11-17 NOTE — PROGRESS NOTES
[address] THERAPY  [] EVALUATION  [x] DAILY NOTE (LAND) [] DAILY NOTE (AQUATIC ) [] PROGRESS NOTE [] DISCHARGE NOTE    [] [address] - [place]   [] Kade     [] [address] [facility]   [x] Farhandarrell Bachelor    Date: 2022  Patient Name:  Jatinder Mathur  : 1965  MRN: [id number]  CSN: [id number]    Referring Practitioner Lindsey Ag DO   Diagnosis Adhesive capsulitis of left shoulder [M75.02]  Incomplete rotator cuff tear or rupture of left shoulder, not specified as traumatic [M75.112]    Treatment Diagnosis Adhesive capsulitis of left shoulder   Date of Evaluation 9/15/22      Functional Outcome Measure Used Upper Extremity Functional Scale   Functional Outcome Score 72/80 (9/15/22) 69/80 (10/13/22)      Insurance: Primary: Payor: Poil Herrera /  /  / ,   Secondary:    Authorization Information: 90 visits PT/OT/ST combined - none used, no precert required, aquatics covered, modalities covered   Visit # 17, 8/10 for progress note, PN completed 10/13/22   Visits Allowed: 30   Recertification Date: November 10, 2022   Physician Follow-Up: PRN   Physician Orders: Script for eval adhesive capsulitis of left shoulder, incomplete tear of left rotator cuff, unspecified whether traumatic   Pertinent History:7 Patient reports about 6 months ago she notices she was having some mobility and strength issues in her left shoulder and when she moved it would catch. No specific injury reported. Patient reports partial tear and a cyst on left shoulder per MRI. Past medical history includes high blood pressure. SUBJECTIVE: Patient reports some stiffness today. OBJECTIVE:  TREATMENT   Precautions:  none per patient    Pain: denies pain today.        X in shaded column indicates Activity Completed Today   Modalities Parameters/  Location  Notes/Comments   Ultrasound to L shoulder posterior shoulder for pain control 100% continuous, 1.1 henley/cm2, 1 MHz x 8 minutes  Declined ultrasound today. Manual Therapy Time/  Technique  Notes/Comments   Supine PROM to left shoulder all planes to tolerance  X Tightest in ER   STM manually to left posterior shoulder and upper trap for tight/painful musculature   Left posterior shoulder primarily today   STM manually to anterior and lateral shoulder for pain control      GH mobs   X    Sidelying scap mobs      Exercises   Sets/  Sec Reps  Notes/Comments   Scapular retraction 3 sec 10  No pain reported this date   Pulleys for shoulder flexion 1 15 X    Biodex UBE at 75 3 minutes backwards and 3 minutes forward  6 min X Warm up   Sidelying IR sleeper stretch 15 sec 5     Supine vertical towel roll with manual overpressure from therapist        Supine ER with hands behind head for ER stretch 10  sec 5  Vertical towel roll under spine. Supine SA punch with 3# weight 2 sec 20 X Increased weight   Supine shoulder circles CW and CCW 1 15 ea X Increased weight 2#   Modified corner pec stretch 10 3     Posterior capsule stretch 10 5     Towel stretch IR  10 sec 3  Initiated.  Good stretch noted, weakness    Orange theraband for rows, shoulder flexion, extension, IR and ER with towel roll under arm 1 20 X 15 reps for ER   Sitting orange theraband for horizontal abduction, diaganols down and diaganols up 1 15 ea X Some popping with diaganols up   Supine alphabet for scapular strengthening  all X Increased weight to 2#   Prone horizontal abduction, scaption and extension with palm flat 2 sec 10 X Cues for technique, tightness in scaption noted   Corner pec stretch  15 sec 5 X    Sidelying ER with towel at elbow 2 sec 10 X 2# weight   Sidelying Abduction with palm forward 1 12 X 2# weight         Activities Time    Notes/Comments   Kinesiotape- Y strip upper trap inhibition, one I strip mechanical correction anterior to posterior shoulder and one I strip for bicep inhibition    Patient states she thinks Kinesiotape helped. Goal assessment completed              Specific Interventions Next Treatment: ultrasound as needed for pain control, Kinesiotaping, AROM, AAROM, PROM, rotator cuff strengthening, stretching    Activity/Treatment Tolerance:  [x]  Patient tolerated treatment well  []  Patient limited by fatigue  []  Patient limited by pain   []  Patient limited by other medical complications  []  Other:     Assessment: Patient is progressing towards her  goals. Tolerating increased strengthening/reps well. Areas for Improvement: impaired ROM, impaired sensation, impaired strength, and pain  Prognosis: good    GOALS:  Patient Goal: increase strength, get rid of pain with sleeping    Short Term Goals:  Time Frame: 5 weeks  Patient will be independent with UE HEP for increased ease with dressing and doing her hair. GOAL MET. Patient reports doing her HEP 3-4 times a day. Patient reports increased ease with doing her hair. She states she still modifies dressing using her right arm to help with her left arm. REVISED GOAL:  Patient will be independent with UE HEP updates for increased ease with dressing and doing her hair. Patient will increase AROM left shoulder flexion to 152, extension to 50, abduction to 138, IR to 50 and ER to 65 degrees for increased ease with overhead reaching. GOAL PARTIALLY MET. AROM left shoulder flexion = 150, extension = 50, abduction = 145, IR = 55 and ER = 65 degrees. REVISED GOAL:  Patient will increase AROM left shoulder flexion to 155, extension to 55, abduction to 150, IR to 60 and ER to 70 degrees for increased ease with overhead reaching. Patient will increase PROM left shoulder flexion to 148, extension to 55, abduction to 150, IR to and ER to 70 degrees for increased ease with AROM and hooking her bra. GOAL PARTIALLY MET. PROM left shoulder flexion = 163, extension = 60, abduction = 150, IR = 75 and ER = 58 degrees.   REVISED GOAL:  Patient will increase PROM left shoulder abduction to 155, ER to 70 degrees for increased ease with AROM and doing her hair. Patient will report pain in her left shoulder no greater than 2/10 with reaching tasks. GOAL NOT MET. Patient reports pain is more with bringing her arm back down after reaching at 3/10. CONTINUE GOAL. Long Term Goals:  Time Frame: 8 weeks  1. Patient will improve UEFS to at least 76. GOAL NOT MET. UEFS = 69/80. CONTINUE GOAL. 2.  Patient will demonstrate ability to reach overhead for an object with affected extremity without increased pain. CONTINUE GOAL. Patient reports pain is more with bringing her arm down. 3.  Patient will report increased ease with sleep with no instances of left shoulder pain waking her at night. GOAL NOT MET. Patient reports left shoulder pain wakes her up at night yet. 3 times a night. CONTINUE GOAL. Patient Education:   []  HEP/Education Completed: Plan of Care, Goals, plan of care  Philip Montano Access Code for HEP: Access Code: [email or web address]  URL: [email or web address]  Date: 09/15/2022  Prepared by:  Junior Daniels    Exercises  Seated Scapular Retraction - 2-3 x daily - 7 x weekly - 1 sets - 10 reps  Standing Shoulder Posterior Capsule Stretch - 2-3 x daily - 7 x weekly - 1 sets - 10 reps  Wall [place] - 2 x daily - 7 x weekly - 1 sets - 10 reps - 3 hold  Shoulder Flexion Wall Slide with Towel - 2 x daily - 7 x weekly - 1 sets - 10 reps - 3 hold  Standing Shoulder Abduction Slides at Wall - 2 x daily - 7 x weekly - 1 sets - 10 reps - 3 hold  Doorway Pec Stretch at 60 Elevation - 2-3 x daily - 7 x weekly - 1 sets - 5 reps - 10 hold  Sleeper Stretch - 2 x daily - 7 x weekly - 1 sets - 5 reps - 10-15 hold  9/22/22: Purpose of ultrasound  9/23/22: IR towel stretch, supine ER/IR stretch, supine pec stretch on vertical towel roll  9/29/22: discussed balancing muscles with strengthening posterior musculature  10/10/22:  Demonstrated/discussed chin tucks and arnulfo casas to trial  10/13/22: Access Code: [email or web address]  URL: [email or web address]  Date: 10/13/2022  Prepared by: Oly Tavera    Exercises  Prone Single Arm Shoulder Horizontal Abduction with Scapular Retraction and Palm Down - 1-2 x daily - 7 x weekly - 1 sets - 5-10 reps - 2 hold  Prone Shoulder Extension - Single Arm - 1-2 x daily - 7 x weekly - 1 sets - 5-10 reps - 2 hold  Prone Single Arm Shoulder Scaption Palm Down - 1 x daily - 7 x weekly - 1 sets - 5-10 reps - 2 hold  10/18/22: Access Code: [email or web address]  URL: [email or web address]  Date: 10/18/2022  Prepared by: Oly Tavera  Exercises  Scapular Retraction with Resistance - 2 x daily - 7 x weekly - 1 sets - 10 reps  Scapular Retraction with Resistance Advanced - 2 x daily - 7 x weekly - 1 sets - 10 reps  Standing Shoulder Internal Rotation with Anchored Resistance - 2 x daily - 7 x weekly - 1 sets - 10 reps  Shoulder External Rotation with Anchored Resistance - 2 x daily - 7 x weekly - 1 sets - 10 reps  Standing Single Arm Shoulder Flexion with Posterior Anchored Resistance - 2 x daily - 7 x weekly - 1 sets - 10 reps  10/28- sidelying ER and abduction   11/3/22: Access Code: [email or web address]  URL: [email or web address]  Date: 11/03/2022  Prepared by: Oly Tavera    Exercises  Prone Single Arm Shoulder Horizontal Abduction with Scapular Retraction and Palm Down - 1-2 x daily - 7 x weekly - 1 sets - 10 reps - 2 hold  Prone Shoulder Extension - Single Arm - 1-2 x daily - 7 x weekly - 1 sets - 10 reps - 2 hold  Prone Single Arm Shoulder Scaption Palm Down - 1-2 x daily - 7 x weekly - 1 sets - 10 reps - 2 hold    []  No new Education completed  [x]  Reviewed Prior HEP and modified to include sidelying ER with towel and abduction and standing orange theraband shoulder rows, ER/IR, flexion, extension     [x]  Patient verbalized and/or demonstrated understanding of education provided.   []  Patient unable to verbalize and/or demonstrate

## 2022-11-21 ENCOUNTER — APPOINTMENT (OUTPATIENT)
Dept: OCCUPATIONAL THERAPY | Age: 57
End: 2022-11-21
Payer: COMMERCIAL

## 2022-11-28 ENCOUNTER — HOSPITAL ENCOUNTER (OUTPATIENT)
Dept: OCCUPATIONAL THERAPY | Age: 57
Setting detail: THERAPIES SERIES
Discharge: HOME | End: 2022-11-28
Payer: COMMERCIAL

## 2022-11-28 PROCEDURE — 97110 THERAPEUTIC EXERCISES: CPT

## 2022-11-28 NOTE — DISCHARGE SUMMARY
[address] THERAPY  [] EVALUATION  [] DAILY NOTE (LAND) [] DAILY NOTE (AQUATIC ) [] PROGRESS NOTE [x] DISCHARGE NOTE    [] [address] - [place]   [] [address]    [] [address] [facility]   [x] Farhandarrell Bachelor    Date: 2022  Patient Name:  Jatinder Mathur  : 1965  MRN: [id number]  CSN: [id number]    Referring Practitioner Lindsey Ag DO   Diagnosis Adhesive capsulitis of left shoulder [M75.02]  Incomplete rotator cuff tear or rupture of left shoulder, not specified as traumatic [M75.112]    Treatment Diagnosis Adhesive capsulitis of left shoulder   Date of Evaluation 9/15/22      Functional Outcome Measure Used Upper Extremity Functional Scale   Functional Outcome Score 72/80 (9/15/22) 69/80 (10/13/22) 76/80 (22)      Insurance: Primary: Payor: Poli Herrera /  /  / ,   Secondary:    Authorization Information: 90 visits PT/OT/ST combined - none used, no precert required, aquatics covered, modalities covered   Visit # 18, 9/10 for progress note, PN completed 10/13/22   Visits Allowed: 30   Recertification Date: November 10, 2022   Physician Follow-Up: PRN   Physician Orders: Script for eval adhesive capsulitis of left shoulder, incomplete tear of left rotator cuff, unspecified whether traumatic   Pertinent History:7 Patient reports about 6 months ago she notices she was having some mobility and strength issues in her left shoulder and when she moved it would catch. No specific injury reported. Patient reports partial tear and a cyst on left shoulder per MRI. Past medical history includes high blood pressure. SUBJECTIVE: Patient states she is getting over a sinus infection and on antibiotics. Patient states she gets an occasional catch at times but she no longer thinks of it now. She states she is not as sore and is about 90% back to prior level of function.   Patient states she will continue to work on her strength. OBJECTIVE:  TREATMENT   Precautions:  none per patient    Pain: denies pain today. X in shaded column indicates Activity Completed Today   Modalities Parameters/  Location  Notes/Comments   Ultrasound to L shoulder posterior shoulder for pain control 100% continuous, 1.1 henley/cm2, 1 MHz x 8 minutes  Declined ultrasound today. Manual Therapy Time/  Technique  Notes/Comments   Supine PROM to left shoulder all planes to tolerance   Tightest in ER   STM manually to left posterior shoulder and upper trap for tight/painful musculature   Left posterior shoulder primarily today   STM manually to anterior and lateral shoulder for pain control      GH mobs       Sidelying scap mobs      Exercises   Sets/  Sec Reps  Notes/Comments   Pulleys for shoulder flexion 1 15 X    Biodex UBE at 75 3 minutes backwards and 3 minutes forward  6 min X Warm up   Sidelying IR sleeper stretch 15 sec 5     Supine vertical towel roll with manual overpressure from therapist        Supine ER with hands behind head for ER stretch 10  sec 5  Vertical towel roll under spine. Supine SA punch with 3# weight 2 sec 20 X Increased weight   Supine shoulder circles CW and CCW 1 15 ea X Increased weight 3#   Modified corner pec stretch 10 3     Posterior capsule stretch 10 5     Towel stretch IR  10 sec 3 X    Orange theraband for rows, shoulder flexion, extension, IR and ER with towel roll under arm 1 20 X    Sitting orange theraband for horizontal abduction, diaganols down and diaganols up 1 15 ea X Minimal popping with diagonals reported.      Supine alphabet for scapular strengthening  all X Increased weight to 3#   Prone horizontal abduction, scaption and extension with palm flat 2 sec 15 X    Corner pec stretch  15 sec 5     Sidelying ER with towel at elbow 2 sec 10 X 2# weight   Sidelying Abduction with palm forward 1 15 X 2# weight         Activities Time    Notes/Comments   Kinesiotape- Y strip upper trap inhibition, one I strip mechanical correction anterior to posterior shoulder and one I strip for bicep inhibition    Patient states she thinks Kinesiotape helped. Goal assessment completed  X            Specific Interventions Next Treatment: ultrasound as needed for pain control, Kinesiotaping, AROM, AAROM, PROM, rotator cuff strengthening, stretching    Activity/Treatment Tolerance:  [x]  Patient tolerated treatment well  []  Patient limited by fatigue  []  Patient limited by pain   []  Patient limited by other medical complications  []  Other:     Assessment: Patient has made good progress towards her goals. Patient has improved passive and active ROM and decreased pain. She reports increased ease with ADLs and reaching tasks. She reports an occasional catch in the posterior shoulder but not as often. Patient states she was able to hang up [employer] lights without difficulty. Patient is independent with her HEP and is agreeable to discharge from formal therapy and continue with strengthening HEP. Areas for Improvement: impaired ROM, impaired sensation, impaired strength, and pain  Prognosis: good    GOALS:  Patient Goal: increase strength, get rid of pain with sleeping    Short Term Goals:  Time Frame: 5 weeks  Patient will be independent with UE HEP updates for increased ease with dressing and doing her hair. GOAL MET. Patient reports doing her HEP daily and has no difficulty with dressing and bathing. Discharge Goal.    Patient will increase AROM left shoulder flexion to 155, extension to 55, abduction to 150, IR to 60 and ER to 70 degrees for increased ease with overhead reaching. GOAL MET. AROM left shoulder flexion = 157, extension = 55, abduction = 150, IR = 65, ER = 74 degrees. Discharge Goal.   Patient will increase PROM left shoulder abduction to 155, ER to 70 degrees for increased ease with AROM and doing her hair. GOAL MET. PROM left shoulder abduction = 155, ER = 80 degrees.   Discharge Goal.   Patient will report pain in her left shoulder no greater than 2/10 with reaching tasks. GOAL MET. Patient reports pain at worst in last week is 1/10. Discharge Goal.          Long Term Goals:  Time Frame: 8 weeks  1. Patient will improve UEFS to at least 76. GOAL MET. UEFS = 76/80. Discharge Goal.    2.  Patient will demonstrate ability to reach overhead for an object with affected extremity without increased pain. Discharge Goal.    3.  Patient will report increased ease with sleep with no instances of left shoulder pain waking her at night. GOAL NOT MET. Patient reports left shoulder discomfort wakes her at night about 2 x week. Discharge Goal.    Patient Education:   []  HEP/Education Completed: Plan of Care, Goals, plan of care  [address] Access Code for HEP: Access Code: [email or web address]  URL: [email or web address]  Date: 09/15/2022  Prepared by: Iraj Loosen    Exercises  Seated Scapular Retraction - 2-3 x daily - 7 x weekly - 1 sets - 10 reps  Standing Shoulder Posterior Capsule Stretch - 2-3 x daily - 7 x weekly - 1 sets - 10 reps  Wall [place] - 2 x daily - 7 x weekly - 1 sets - 10 reps - 3 hold  Shoulder Flexion Wall Slide with Towel - 2 x daily - 7 x weekly - 1 sets - 10 reps - 3 hold  Standing Shoulder Abduction Slides at Wall - 2 x daily - 7 x weekly - 1 sets - 10 reps - 3 hold  Doorway Pec Stretch at 60 Elevation - 2-3 x daily - 7 x weekly - 1 sets - 5 reps - 10 hold  Sleeper Stretch - 2 x daily - 7 x weekly - 1 sets - 5 reps - 10-15 hold  9/22/22: Purpose of ultrasound  9/23/22: IR towel stretch, supine ER/IR stretch, supine pec stretch on vertical towel roll  9/29/22: discussed balancing muscles with strengthening posterior musculature  10/10/22:  Demonstrated/discussed chin tucks and wall angels to trial  10/13/22: Access Code: [email or web address]  URL: [email or web address]  Date: 10/13/2022  Prepared by:  Iraj Loosen    Exercises  Prone Single Arm Shoulder Horizontal Abduction with Scapular Retraction and Palm Down - 1-2 x daily - 7 x weekly - 1 sets - 5-10 reps - 2 hold  Prone Shoulder Extension - Single Arm - 1-2 x daily - 7 x weekly - 1 sets - 5-10 reps - 2 hold  Prone Single Arm Shoulder Scaption Palm Down - 1 x daily - 7 x weekly - 1 sets - 5-10 reps - 2 hold  10/18/22: Access Code: [email or web address]  URL: [email or web address]  Date: 10/18/2022  Prepared by: Trevor Malady  Exercises  Scapular Retraction with Resistance - 2 x daily - 7 x weekly - 1 sets - 10 reps  Scapular Retraction with Resistance Advanced - 2 x daily - 7 x weekly - 1 sets - 10 reps  Standing Shoulder Internal Rotation with Anchored Resistance - 2 x daily - 7 x weekly - 1 sets - 10 reps  Shoulder External Rotation with Anchored Resistance - 2 x daily - 7 x weekly - 1 sets - 10 reps  Standing Single Arm Shoulder Flexion with Posterior Anchored Resistance - 2 x daily - 7 x weekly - 1 sets - 10 reps  10/28- sidelying ER and abduction   11/3/22: Access Code: [email or web address]  URL: [email or web address]  Date: 11/03/2022  Prepared by: Trevor Malady    Exercises  Prone Single Arm Shoulder Horizontal Abduction with Scapular Retraction and Palm Down - 1-2 x daily - 7 x weekly - 1 sets - 10 reps - 2 hold  Prone Shoulder Extension - Single Arm - 1-2 x daily - 7 x weekly - 1 sets - 10 reps - 2 hold  Prone Single Arm Shoulder Scaption Palm Down - 1-2 x daily - 7 x weekly - 1 sets - 10 reps - 2 hold    []  No new Education completed  [x]  Reviewed Prior HEP      [x]  Patient verbalized and/or demonstrated understanding of education provided. []  Patient unable to verbalize and/or demonstrate understanding of education provided. Will continue education. [x]  Barriers to learning: none    PLAN:  Treatment Recommendations: Strengthening, Range of Motion, Manual Therapy - Soft Tissue Mobilization, Manual Therapy - Joint Manipulation, Home Exercise Program, and Modalities    []  Plan of care initiated.   Plan to see patient 2 times per week for 8 weeks to address the treatment planned outlined above. []  Continue with current plan of care  []  Modify plan of care as follows:    []  Hold pending physician visit  [x]  Discharge    Time In    Time Out 0859   Timed Code Minutes: 45 min   Total Treatment Time: 45 min       Electronically Signed by:  AMANDA Peters/SHERITA #[id number]